# Patient Record
Sex: FEMALE | Race: OTHER | NOT HISPANIC OR LATINO | URBAN - METROPOLITAN AREA
[De-identification: names, ages, dates, MRNs, and addresses within clinical notes are randomized per-mention and may not be internally consistent; named-entity substitution may affect disease eponyms.]

---

## 2017-03-18 ENCOUNTER — EMERGENCY (EMERGENCY)
Facility: HOSPITAL | Age: 27
LOS: 1 days | Discharge: HOME | End: 2017-03-18
Attending: EMERGENCY MEDICINE | Admitting: EMERGENCY MEDICINE
Payer: COMMERCIAL

## 2017-03-18 VITALS
RESPIRATION RATE: 18 BRPM | OXYGEN SATURATION: 100 % | HEART RATE: 80 BPM | TEMPERATURE: 98 F | DIASTOLIC BLOOD PRESSURE: 68 MMHG | SYSTOLIC BLOOD PRESSURE: 113 MMHG

## 2017-03-18 VITALS
OXYGEN SATURATION: 100 % | HEART RATE: 74 BPM | SYSTOLIC BLOOD PRESSURE: 120 MMHG | TEMPERATURE: 99 F | DIASTOLIC BLOOD PRESSURE: 60 MMHG | RESPIRATION RATE: 20 BRPM

## 2017-03-18 LAB
ALBUMIN SERPL ELPH-MCNC: 4.1 G/DL — SIGNIFICANT CHANGE UP (ref 3.3–5)
ALP SERPL-CCNC: 79 U/L — SIGNIFICANT CHANGE UP (ref 40–120)
ALT FLD-CCNC: 11 U/L — SIGNIFICANT CHANGE UP (ref 4–33)
APPEARANCE UR: CLEAR — SIGNIFICANT CHANGE UP
AST SERPL-CCNC: 19 U/L — SIGNIFICANT CHANGE UP (ref 4–32)
BACTERIA # UR AUTO: SIGNIFICANT CHANGE UP
BASOPHILS # BLD AUTO: 0.03 K/UL — SIGNIFICANT CHANGE UP (ref 0–0.2)
BASOPHILS NFR BLD AUTO: 0.3 % — SIGNIFICANT CHANGE UP (ref 0–2)
BILIRUB SERPL-MCNC: 0.3 MG/DL — SIGNIFICANT CHANGE UP (ref 0.2–1.2)
BILIRUB UR-MCNC: NEGATIVE — SIGNIFICANT CHANGE UP
BLOOD UR QL VISUAL: HIGH
BUN SERPL-MCNC: 8 MG/DL — SIGNIFICANT CHANGE UP (ref 7–23)
CALCIUM SERPL-MCNC: 9.3 MG/DL — SIGNIFICANT CHANGE UP (ref 8.4–10.5)
CHLORIDE SERPL-SCNC: 104 MMOL/L — SIGNIFICANT CHANGE UP (ref 98–107)
CO2 SERPL-SCNC: 20 MMOL/L — LOW (ref 22–31)
COLOR SPEC: SIGNIFICANT CHANGE UP
CREAT SERPL-MCNC: 0.56 MG/DL — SIGNIFICANT CHANGE UP (ref 0.5–1.3)
EOSINOPHIL # BLD AUTO: 0.08 K/UL — SIGNIFICANT CHANGE UP (ref 0–0.5)
EOSINOPHIL NFR BLD AUTO: 0.8 % — SIGNIFICANT CHANGE UP (ref 0–6)
GLUCOSE SERPL-MCNC: 85 MG/DL — SIGNIFICANT CHANGE UP (ref 70–99)
GLUCOSE UR-MCNC: NEGATIVE — SIGNIFICANT CHANGE UP
HCG SERPL-ACNC: 2480 MIU/ML — SIGNIFICANT CHANGE UP
HCT VFR BLD CALC: 38 % — SIGNIFICANT CHANGE UP (ref 34.5–45)
HGB BLD-MCNC: 12.1 G/DL — SIGNIFICANT CHANGE UP (ref 11.5–15.5)
IMM GRANULOCYTES NFR BLD AUTO: 0.2 % — SIGNIFICANT CHANGE UP (ref 0–1.5)
KETONES UR-MCNC: NEGATIVE — SIGNIFICANT CHANGE UP
LEUKOCYTE ESTERASE UR-ACNC: NEGATIVE — SIGNIFICANT CHANGE UP
LYMPHOCYTES # BLD AUTO: 2.34 K/UL — SIGNIFICANT CHANGE UP (ref 1–3.3)
LYMPHOCYTES # BLD AUTO: 24.1 % — SIGNIFICANT CHANGE UP (ref 13–44)
MCHC RBC-ENTMCNC: 26.8 PG — LOW (ref 27–34)
MCHC RBC-ENTMCNC: 31.8 % — LOW (ref 32–36)
MCV RBC AUTO: 84.1 FL — SIGNIFICANT CHANGE UP (ref 80–100)
MONOCYTES # BLD AUTO: 0.56 K/UL — SIGNIFICANT CHANGE UP (ref 0–0.9)
MONOCYTES NFR BLD AUTO: 5.8 % — SIGNIFICANT CHANGE UP (ref 2–14)
MUCOUS THREADS # UR AUTO: SIGNIFICANT CHANGE UP
NEUTROPHILS # BLD AUTO: 6.67 K/UL — SIGNIFICANT CHANGE UP (ref 1.8–7.4)
NEUTROPHILS NFR BLD AUTO: 68.8 % — SIGNIFICANT CHANGE UP (ref 43–77)
NITRITE UR-MCNC: NEGATIVE — SIGNIFICANT CHANGE UP
PH UR: 6 — SIGNIFICANT CHANGE UP (ref 4.6–8)
PLATELET # BLD AUTO: 373 K/UL — SIGNIFICANT CHANGE UP (ref 150–400)
PMV BLD: 10 FL — SIGNIFICANT CHANGE UP (ref 7–13)
POTASSIUM SERPL-MCNC: 4.2 MMOL/L — SIGNIFICANT CHANGE UP (ref 3.5–5.3)
POTASSIUM SERPL-SCNC: 4.2 MMOL/L — SIGNIFICANT CHANGE UP (ref 3.5–5.3)
PROT SERPL-MCNC: 7.3 G/DL — SIGNIFICANT CHANGE UP (ref 6–8.3)
PROT UR-MCNC: NEGATIVE — SIGNIFICANT CHANGE UP
RBC # BLD: 4.52 M/UL — SIGNIFICANT CHANGE UP (ref 3.8–5.2)
RBC # FLD: 14 % — SIGNIFICANT CHANGE UP (ref 10.3–14.5)
RBC CASTS # UR COMP ASSIST: SIGNIFICANT CHANGE UP (ref 0–?)
SODIUM SERPL-SCNC: 140 MMOL/L — SIGNIFICANT CHANGE UP (ref 135–145)
SP GR SPEC: 1.01 — SIGNIFICANT CHANGE UP (ref 1–1.03)
SQUAMOUS # UR AUTO: SIGNIFICANT CHANGE UP
UROBILINOGEN FLD QL: NORMAL E.U. — SIGNIFICANT CHANGE UP (ref 0.1–0.2)
WBC # BLD: 9.7 K/UL — SIGNIFICANT CHANGE UP (ref 3.8–10.5)
WBC # FLD AUTO: 9.7 K/UL — SIGNIFICANT CHANGE UP (ref 3.8–10.5)
WBC UR QL: SIGNIFICANT CHANGE UP (ref 0–?)

## 2017-03-18 PROCEDURE — 76830 TRANSVAGINAL US NON-OB: CPT | Mod: 26

## 2017-03-18 PROCEDURE — 99284 EMERGENCY DEPT VISIT MOD MDM: CPT

## 2017-03-18 RX ORDER — SODIUM CHLORIDE 9 MG/ML
1000 INJECTION INTRAMUSCULAR; INTRAVENOUS; SUBCUTANEOUS ONCE
Qty: 0 | Refills: 0 | Status: COMPLETED | OUTPATIENT
Start: 2017-03-18 | End: 2017-03-18

## 2017-03-18 RX ADMIN — SODIUM CHLORIDE 3000 MILLILITER(S): 9 INJECTION INTRAMUSCULAR; INTRAVENOUS; SUBCUTANEOUS at 16:02

## 2017-03-18 NOTE — ED PROVIDER NOTE - NS ED MD SCRIBE ATTENDING SCRIBE SECTIONS
DISPOSITION/VITAL SIGNS( Pullset)/HIV/REVIEW OF SYSTEMS/PAST MEDICAL/SURGICAL/SOCIAL HISTORY/PHYSICAL EXAM/HISTORY OF PRESENT ILLNESS

## 2017-03-18 NOTE — ED PROVIDER NOTE - CARE PLAN
Principal Discharge DX:	Threatened   Secondary Diagnosis:	Vaginal bleeding in pregnancy, first trimester

## 2017-03-18 NOTE — ED PROVIDER NOTE - OBJECTIVE STATEMENT
27yo F A0 LMP 17 6 WGA presenting for vaginal bleeding starting this morning. Pt also c/o pelvic pain, although denies nausea/vomiting or any tissue ot blood clotting. Pt reports she had LEEP procedure last year.

## 2017-03-18 NOTE — ED ADULT TRIAGE NOTE - CHIEF COMPLAINT QUOTE
pt comes to ed for vag bleeding without clots x once this monring  states she has intermittent pain across the pelvic area. pt has two children at home  delivered via c section

## 2017-03-18 NOTE — ED PROVIDER NOTE - PROGRESS NOTE DETAILS
Patient's labs within normal as well as US for IUP. Patient with threatened , she was oriented about proper follow up and that she needs a B-HCG and US in 48 hours, and to return to the ED if profuse bleeding od pass of clots or tissue.

## 2017-03-20 ENCOUNTER — EMERGENCY (EMERGENCY)
Facility: HOSPITAL | Age: 27
LOS: 1 days | Discharge: HOME | End: 2017-03-20
Attending: EMERGENCY MEDICINE | Admitting: EMERGENCY MEDICINE
Payer: COMMERCIAL

## 2017-03-20 VITALS
SYSTOLIC BLOOD PRESSURE: 108 MMHG | HEART RATE: 85 BPM | TEMPERATURE: 98 F | OXYGEN SATURATION: 98 % | RESPIRATION RATE: 18 BRPM | DIASTOLIC BLOOD PRESSURE: 47 MMHG

## 2017-03-20 VITALS — HEART RATE: 67 BPM | OXYGEN SATURATION: 100 % | TEMPERATURE: 98 F | RESPIRATION RATE: 18 BRPM

## 2017-03-20 LAB
BASOPHILS # BLD AUTO: 0.04 K/UL — SIGNIFICANT CHANGE UP (ref 0–0.2)
BASOPHILS NFR BLD AUTO: 0.5 % — SIGNIFICANT CHANGE UP (ref 0–2)
EOSINOPHIL # BLD AUTO: 0.13 K/UL — SIGNIFICANT CHANGE UP (ref 0–0.5)
EOSINOPHIL NFR BLD AUTO: 1.7 % — SIGNIFICANT CHANGE UP (ref 0–6)
HCG SERPL-ACNC: 771 MIU/ML — SIGNIFICANT CHANGE UP
HCT VFR BLD CALC: 37.8 % — SIGNIFICANT CHANGE UP (ref 34.5–45)
HGB BLD-MCNC: 12.1 G/DL — SIGNIFICANT CHANGE UP (ref 11.5–15.5)
IMM GRANULOCYTES NFR BLD AUTO: 0.1 % — SIGNIFICANT CHANGE UP (ref 0–1.5)
LYMPHOCYTES # BLD AUTO: 2.24 K/UL — SIGNIFICANT CHANGE UP (ref 1–3.3)
LYMPHOCYTES # BLD AUTO: 29.2 % — SIGNIFICANT CHANGE UP (ref 13–44)
MCHC RBC-ENTMCNC: 27.1 PG — SIGNIFICANT CHANGE UP (ref 27–34)
MCHC RBC-ENTMCNC: 32 % — SIGNIFICANT CHANGE UP (ref 32–36)
MCV RBC AUTO: 84.8 FL — SIGNIFICANT CHANGE UP (ref 80–100)
MONOCYTES # BLD AUTO: 0.46 K/UL — SIGNIFICANT CHANGE UP (ref 0–0.9)
MONOCYTES NFR BLD AUTO: 6 % — SIGNIFICANT CHANGE UP (ref 2–14)
NEUTROPHILS # BLD AUTO: 4.78 K/UL — SIGNIFICANT CHANGE UP (ref 1.8–7.4)
NEUTROPHILS NFR BLD AUTO: 62.5 % — SIGNIFICANT CHANGE UP (ref 43–77)
PLATELET # BLD AUTO: 386 K/UL — SIGNIFICANT CHANGE UP (ref 150–400)
PMV BLD: 10.3 FL — SIGNIFICANT CHANGE UP (ref 7–13)
RBC # BLD: 4.46 M/UL — SIGNIFICANT CHANGE UP (ref 3.8–5.2)
RBC # FLD: 14.1 % — SIGNIFICANT CHANGE UP (ref 10.3–14.5)
WBC # BLD: 7.66 K/UL — SIGNIFICANT CHANGE UP (ref 3.8–10.5)
WBC # FLD AUTO: 7.66 K/UL — SIGNIFICANT CHANGE UP (ref 3.8–10.5)

## 2017-03-20 PROCEDURE — 99283 EMERGENCY DEPT VISIT LOW MDM: CPT

## 2017-03-20 RX ORDER — SODIUM CHLORIDE 9 MG/ML
1000 INJECTION INTRAMUSCULAR; INTRAVENOUS; SUBCUTANEOUS ONCE
Qty: 0 | Refills: 0 | Status: COMPLETED | OUTPATIENT
Start: 2017-03-20 | End: 2017-03-20

## 2017-03-20 RX ADMIN — SODIUM CHLORIDE 1000 MILLILITER(S): 9 INJECTION INTRAMUSCULAR; INTRAVENOUS; SUBCUTANEOUS at 15:30

## 2017-03-20 NOTE — ED ADULT TRIAGE NOTE - CHIEF COMPLAINT QUOTE
p/t is about 5 weeks pregnant c/o of vag bleed for few days, p/t seen in ED 3 days ago for vag bleed as well nad noted

## 2017-03-20 NOTE — ED PROVIDER NOTE - OBJECTIVE STATEMENT
pt 5-6 weeks pregnant with vaginal bleeding  Here 2 days ago with light bleeding  heavier with cramps today    no fever GI or  complaints  pt had US here  IUP too early to detect FH

## 2017-03-20 NOTE — ED PROVIDER NOTE - MEDICAL DECISION MAKING DETAILS
pregnant with vaginal bleeding  IUP here 2 days ago  check beta  pt RH negative  ( Rh +)  with heavier bleed will give Rhogam pregnant with vaginal bleeding  IUP here 2 days ago  check beta  pt RH negative  ( Rh +)  with heavier bleed will give Rhogam  beta falling os closed no POC passed  will need to follow up with private GYN

## 2019-04-10 NOTE — ED PROVIDER NOTE - RESPIRATORY NEGATIVE STATEMENT, MLM
----- Message from Lacy Lorenzo MD sent at 4/9/2019 10:12 PM CDT -----  Call patient notify   Labs look ok, but creatinine is increasing with water pill. Continue current medication, but recheck bmp in 7-10 days. Rest of labs ok.   no chest pain, no cough, and no shortness of breath.

## 2022-01-03 ENCOUNTER — APPOINTMENT (OUTPATIENT)
Dept: ORTHOPEDIC SURGERY | Facility: CLINIC | Age: 32
End: 2022-01-03

## 2022-03-05 ENCOUNTER — EMERGENCY (EMERGENCY)
Facility: HOSPITAL | Age: 32
LOS: 1 days | Discharge: HOME | End: 2022-03-05
Attending: EMERGENCY MEDICINE
Payer: COMMERCIAL

## 2022-03-05 VITALS
WEIGHT: 134.04 LBS | HEIGHT: 63 IN | OXYGEN SATURATION: 98 % | SYSTOLIC BLOOD PRESSURE: 103 MMHG | RESPIRATION RATE: 16 BRPM | HEART RATE: 55 BPM | TEMPERATURE: 99 F | DIASTOLIC BLOOD PRESSURE: 56 MMHG

## 2022-03-05 VITALS
SYSTOLIC BLOOD PRESSURE: 95 MMHG | HEART RATE: 70 BPM | DIASTOLIC BLOOD PRESSURE: 70 MMHG | OXYGEN SATURATION: 100 % | RESPIRATION RATE: 18 BRPM | TEMPERATURE: 99 F

## 2022-03-05 DIAGNOSIS — Z98.1 ARTHRODESIS STATUS: Chronic | ICD-10-CM

## 2022-03-05 PROCEDURE — 93010 ELECTROCARDIOGRAM REPORT: CPT

## 2022-03-05 PROCEDURE — 93005 ELECTROCARDIOGRAM TRACING: CPT

## 2022-03-05 PROCEDURE — 99283 EMERGENCY DEPT VISIT LOW MDM: CPT

## 2022-03-05 PROCEDURE — 99284 EMERGENCY DEPT VISIT MOD MDM: CPT

## 2022-03-05 PROCEDURE — 82962 GLUCOSE BLOOD TEST: CPT

## 2022-03-05 RX ORDER — ALPRAZOLAM 0.25 MG
2 TABLET ORAL ONCE
Refills: 0 | Status: DISCONTINUED | OUTPATIENT
Start: 2022-03-05 | End: 2022-03-05

## 2022-03-05 RX ORDER — ALPRAZOLAM 0.25 MG
1 TABLET ORAL
Qty: 6 | Refills: 0
Start: 2022-03-05 | End: 2022-03-06

## 2022-03-05 RX ADMIN — Medication 2 MILLIGRAM(S): at 18:53

## 2022-03-05 NOTE — ED PROVIDER NOTE - CLINICAL SUMMARY MEDICAL DECISION MAKING FREE TEXT BOX
ABBEY AGUILERA is a 31 YEAR OLD FEMALE who presents to ER for CC of "Lightheadedness, Dizziness, and Blacking Out" that occurred today when Nurse was speaking w/ ABBEY about the incision site and she got lightheaded, dizzy, and "blacked out..." No falling to the ground, no head strike. Cardiac ROS negative. Infectious ROS negative. Neuro ROS negative. VSS. PE sig only for well healing incision sites w/ dressing in place, otherwise unremarkable. Pt appears anxious, but is well appearing. Obtain EKG, POC Glucose, Orthostatic VS. Cont. to re-assess. Obtain U Preg.

## 2022-03-05 NOTE — ED ADULT NURSE NOTE - OBJECTIVE STATEMENT
Pt is a 31yoF who had a lumbar fusion at [facility], was given percocet for pain to take home. Today pt was seen by a home nurse and had a syncopal episode that lasted 2-3 seconds. Was told by her MD to come to ER to rule out a PE.

## 2022-03-05 NOTE — ED PROVIDER NOTE - ATTENDING CONTRIBUTION TO CARE
I personally saw the patient and performed a substantive portion of the visit including all aspects of the medical decision making.    Patient reporting episode of near syncope this morning associated with significant anxiety/stress after recent spinal surgery.  Also reporting having an adverse reaction to the tizanadine she is taking post op.  Denying chest pains, shortness of breath or palpitations in association.  Unremarkable exam, clean appearing post op site, neuro intact, normal orthostatics.  EKG unremarkable.  No high risk features of history to suggest ACS or PE as underlying etiology, suspect syncope secondary to emotional stress, patient reporting feeling improved after PO xanax in Emergency Department will discharge with outpatient follow up.

## 2022-03-05 NOTE — ED PROVIDER NOTE - INTERPRETATION
normal sinus rhythm, Normal axis, Normal TN interval and QRS complex. There are no acute ischemic ST or T-wave changes. sinus bradycardia

## 2022-03-05 NOTE — ED PROVIDER NOTE - NEUROLOGICAL, MLM
Alert and oriented, no focal deficits, no motor or sensory deficits. Neuro: Strength 5/5 in Bilateral Upper and Lower Extremities. Sensation intact in bilateral upper and lower extremities. PERRLA. EOMs full and intact. Sensation intact in the face. Patient able to smile, puff out cheeks, close eyes tightly and prevent eye-opening by examiner. Patient able to shoulder shrug against resistance. No deviation of the tongue. Palate and uvula rise - midline uvula.

## 2022-03-05 NOTE — ED ADULT NURSE NOTE - CADM POA PRESS ULCER
Urine Dip 11/18/2021   Blood neg   Leukocytes trace       No bleeding. No leaking of fluids. No contractions.   No

## 2022-03-05 NOTE — ED ADULT NURSE REASSESSMENT NOTE - NS ED NURSE REASSESS COMMENT FT1
orthostatic vitals normal, pt adjusted back into the stretcher. Pt continues to appear very anxious but pain free at this time,  at bedside.

## 2022-03-05 NOTE — ED ADULT NURSE REASSESSMENT NOTE - NS ED NURSE REASSESS COMMENT FT1
pt continues to be alert, v/s stable and will give tray of food.  at bedside, pt appears less anxious.

## 2022-03-05 NOTE — ED PROVIDER NOTE - OBJECTIVE STATEMENT
ABBEY AGUILERA is a 31 YEAR OLD FEMALE ABBEY AGUILERA is a 31 YEAR OLD FEMALE who presents to ER for CC of "Lightheadedness, Dizziness, and Blacking Out."  Last Oxycodone was in AM; Last Tizinadine at 130PM  ABBEY reports that today she had a BM in the morning, then went about her normal day  The nurse came to the house at 1500PM where they had ABBEY walk  Afterwards, ABBEY and the nurse were discussing the incision site when ABBEY started to get dizzy and appeared to "black out..." She did not fall to the ground, did not strike her head  ABBEY and  also report that the medications she was prescribed are occasionally making her feel "delirious" and have a sense of unreality  Denies fevers, chills, warmth of the incision site, purulent drainage from the incision site, tenderness of the incision site  Denies headache, vomiting, nausea, weakness of the extremities, sensory changes, convulsions  Denies palpitations, chest pain, shortness of breath, difficulty breathing, edema, calf swelling, calf pain  PMH: L5-S1 Spinal Injury (Slipped Disk w/ Neuropathic Pain)  Meds: Oxycodone 10mg q6h prn pain; Tizinadine 4mg tid  PSH: 2/28/2022 w/ Lumbarectomy L5-S1 Fusion performed by Dr. Rich Yarbrough at [facility] ABBEY AGUILERA is a 31 YEAR OLD FEMALE who presents to ER for CC of "Lightheadedness, Dizziness, and Blacking Out."  Last Oxycodone was in AM; Last Tizinadine at 130PM  ABBEY reports that today she had a BM in the morning, then went about her normal day  The nurse came to the house at 1500PM where they had ABBEY walk  Afterwards, ABBEY and the nurse were discussing the incision site when ABBEY started to get dizzy and appeared to "black out..." She did not fall to the ground, did not strike her head  ABBEY and  also report that the medications she was prescribed are occasionally making her feel "delirious" and have a sense of unreality  Denies fevers, chills, warmth of the incision site, purulent drainage from the incision site, tenderness of the incision site  Denies headache, vomiting, nausea, weakness of the extremities, sensory changes, convulsions  Denies palpitations, chest pain, shortness of breath, difficulty breathing, edema, calf swelling, calf pain  Currently on her menstrual period  PMH: L5-S1 Spinal Injury (Slipped Disk w/ Neuropathic Pain)  Meds: Oxycodone 10mg q6h prn pain; Tizinadine 4mg tid  PSH: 2/28/2022 w/ Lumbarectomy L5-S1 Fusion performed by Dr. Rich Yarbrough at [facility]  IUTD ABBEY AGUILERA is a 31 YEAR OLD FEMALE who presents to ER for CC of "Lightheadedness, Dizziness, and Blacking Out."  Last Oxycodone was in AM; Last Tizinadine at 130PM  ABBEY reports that today she had a BM in the morning, then went about her normal day  The nurse came to the house at 1500PM where they had ABBEY walk  Afterwards, ABBEY and the nurse were discussing the incision site when ABBEY started to get dizzy and appeared to "black out..." She did not fall to the ground, did not strike her head  ABBEY and  also report that the medications she was prescribed are occasionally making her feel "delirious" and have a sense of unreality  Denies fevers, chills, warmth of the incision site, purulent drainage from the incision site, tenderness of the incision site  Denies headache, vomiting, nausea, weakness of the extremities, sensory changes, convulsions  Denies palpitations, chest pain, shortness of breath, difficulty breathing, edema, calf swelling, calf pain  Currently on her menstrual period  PMH: L5-S1 Spinal Injury (Slipped Disk w/ Neuropathic Pain), Anxiety  Meds: Oxycodone 10mg q6h prn pain; Tizinadine 4mg tid; Xanax 2mg prn Anxiety  PSH: 2/28/2022 w/ Lumbarectomy L5-S1 Fusion performed by Dr. Rich Yarbrough at [facility]  NKDA  IUTD

## 2022-03-05 NOTE — ED PROVIDER NOTE - PROGRESS NOTE DETAILS
POC Glucose Normal. EKG w/ sinus bradycardia (57bpm). Orthostatic VS WNL. Will re-assess pt. Kannan Nazario PA-C Pt continues to have anxiety. Has home med of 2mg Xanax prn anxiety. Will give home medication here for anxiety. Cont. to re-assess. Kannan Nazario PA-C Pt feels much better. Feels ready to go home. Not experiencing any pain. Resting comfortably. Exam unremarkable. Patient is stable, in no apparent distress, non-toxic appearing, tolerating PO, no neurologic deficits, and is cleared for discharge to home. Kannan Nazario PA-C Pt feels much better. Feels ready to go home. Not experiencing any pain. Resting comfortably. Exam unremarkable. Patient is stable, in no apparent distress, non-toxic appearing, tolerating PO, no neurologic deficits, and is cleared for discharge to home. Pt continuing to have anxiety over past few days s/p surgical procedure. Dr. Shi and I agree that we will write for 2 days of Xanax 2mg tid prn anxiety but not to be used within 4 hours of using Oxycodone. F/U PMD on Monday. Kannan Nazario PA-C Pt continues to have anxiety. Was receiving 2mg Xanax prn anxiety s/p surgery while admitted. Will give dose of Xanax here for anxiety. Cont. to re-assess. Kannan Nazario PA-C

## 2022-03-05 NOTE — ED PROVIDER NOTE - PHYSICAL EXAMINATION
Skin: Back w/ surgical incision sites w/ dressing in place. Healing normally. No warmth, tenderness, purulent drainage.

## 2022-03-05 NOTE — ED PROVIDER NOTE - NSFOLLOWUPINSTRUCTIONS_ED_ALL_ED_FT
ABBEY was seen in the ER for Lightheadedness, Dizziness, and "Blacking Out." Based on the history, it appears that she had an episode of Syncope, or fainting.    Her blood sugar was normal. Her EKG was normal. Her orthostatic vital signs were normal.    She received a dose of her home medication, Xanax 2mg, while here in the ER.    Her vital signs remained normal while in the ER - her physical examination was normal. There were no signs of infection at the surgical site.    Continue Oxycodone as prescribed by your Neurosurgical team. It appears that ABBEY may be having a reaction to her medication, and we feel that discontinuing use of the Tinazidine may be beneficial to prevent medication side effects.    You may also use Tylenol 975mg every 6 Hours as needed for pain.    Follow up with your Neurosurgical Doctor for your regularly scheduled appointment.    Return to the ER for any new emergency concerns - refer to the instructions below for reasons to return to the ER.    Return for infectious signs or symptoms like fever, chills, pus from the surgical site, extreme tenderness of the surgical site, or any other concerns.            Syncope    WHAT YOU NEED TO KNOW:    Syncope is also called fainting or passing out. Syncope is a sudden, temporary loss of consciousness, followed by a fall from a standing or sitting position. Syncope ranges from not serious to a sign of a more serious condition that needs to be treated. You can control some health conditions that cause syncope. Your healthcare providers can help you create a plan to manage syncope and prevent episodes.    DISCHARGE INSTRUCTIONS:    Seek care immediately if:   •You are bleeding because you hit your head when you fainted.       •You suddenly have double vision, difficulty speaking, numbness, and cannot move your arms or legs.      •You have chest pain and trouble breathing.      •You vomit blood or material that looks like coffee grounds.      •You see blood in your bowel movement.      Contact your healthcare provider if:   •You have new or worsening symptoms.      •You have another syncope episode.      •You have a headache, fast heartbeat, or feel too dizzy to stand up.      •You have questions or concerns about your condition or care.      Medicines:   •Medicines may be needed to help your heart pump strongly and regularly. Your healthcare provider may also make changes to any medicines that are causing syncope.       •Take your medicine as directed. Contact your healthcare provider if you think your medicine is not helping or if you have side effects. Tell him or her if you are allergic to any medicine. Keep a list of the medicines, vitamins, and herbs you take. Include the amounts, and when and why you take them. Bring the list or the pill bottles to follow-up visits. Carry your medicine list with you in case of an emergency.      Follow up with your doctor as directed: Write down your questions so you remember to ask them during your visits.     Manage syncope:   •Keep a record of your syncope episodes. Include your symptoms and your activity before and after the episode. The record can help your healthcare provider find the cause of your syncope and help you manage episodes.      •Sit or lie down when needed. This includes when you feel dizzy, your throat is getting tight, and your vision changes. Raise your legs above the level of your heart.      •Take slow, deep breaths if you start to breathe faster with anxiety or fear. This can help decrease dizziness and the feeling that you might faint.       •Check your blood pressure often. This is important if you take medicine to lower your blood pressure. Check your blood pressure when you are lying down and when you are standing. Ask how often to check during the day. Keep a record of your blood pressure numbers. Your healthcare provider may use the record to help plan your treatment.  How to take a Blood Pressure           Prevent a syncope episode:   •Move slowly and let yourself get used to one position before you move to another position. This is very important when you change from a lying or sitting position to a standing position. Take some deep breaths before you stand up from a lying position. Stand up slowly. Sudden movements may cause a fainting spell. Sit on the side of the bed or couch for a few minutes before you stand up. If you are on bedrest, try to be upright for about 2 hours each day, or as directed. Do not lock your legs if you are standing for a long period of time. Move your legs and bend your knees to keep blood flowing.      •Follow your healthcare provider's recommendations. Your provider may recommend that you drink more liquids to prevent dehydration. You may also need to have more salt to keep your blood pressure from dropping too low and causing syncope. Your provider will tell you how much liquid and sodium to have each day. He or she will also tell you how much physical activity is safe for you. This will depend on what is causing your syncope.      •Watch for signs of low blood sugar. These include hunger, nervousness, sweating, and fast or fluttery heartbeats. Talk with your healthcare provider about ways to keep your blood sugar level steady.      •Do not strain if you are constipated. You may faint if you strain to have a bowel movement. Walking is the best way to get your bowels moving. Eat foods high in fiber to make it easier to have a bowel movement. Good examples are high-fiber cereals, beans, vegetables, and whole-grain breads. Prune juice may help make bowel movements softer.      •Be careful in hot weather. Heat can cause a syncope episode. Limit activity done outside on hot days. Physical activity in hot weather can lead to dehydration. This can cause an episode. ABBEY was seen in the ER for Lightheadedness, Dizziness, and "Blacking Out." Based on the history, it appears that she had an episode of Syncope, or fainting.    Her blood sugar was normal. Her EKG was normal. Her orthostatic vital signs were normal.    She received a dose of her home medication, Xanax 2mg, while here in the ER.    Her vital signs remained normal while in the ER - her physical examination was normal. There were no signs of infection at the surgical site.    Continue Oxycodone as prescribed by your Neurosurgical team. It appears that ABBEY may be having a reaction to her medication, and we feel that discontinuing use of the Tinazidine may be beneficial to prevent medication side effects.    You may also use Tylenol 975mg every 6 Hours as needed for pain.    Given the severe amount of anxiety that ABBEY is facing, we will prescribe a 2 day course of Xanax 2mg Orally to be used every 8 hours ONLY AS NEEDED FOR ANXIETY. This medication SHOULD NOT be taken at the same time as the Oxycodone, and MUST BE  BY AT LEAST 4 HOURS FROM GIVING Oxycodone.    Follow up with your Neurosurgical Doctor for your regularly scheduled appointment.    Return to the ER for any new emergency concerns - refer to the instructions below for reasons to return to the ER.    Return for infectious signs or symptoms like fever, chills, pus from the surgical site, extreme tenderness of the surgical site, or any other concerns.            Syncope    WHAT YOU NEED TO KNOW:    Syncope is also called fainting or passing out. Syncope is a sudden, temporary loss of consciousness, followed by a fall from a standing or sitting position. Syncope ranges from not serious to a sign of a more serious condition that needs to be treated. You can control some health conditions that cause syncope. Your healthcare providers can help you create a plan to manage syncope and prevent episodes.    DISCHARGE INSTRUCTIONS:    Seek care immediately if:   •You are bleeding because you hit your head when you fainted.       •You suddenly have double vision, difficulty speaking, numbness, and cannot move your arms or legs.      •You have chest pain and trouble breathing.      •You vomit blood or material that looks like coffee grounds.      •You see blood in your bowel movement.      Contact your healthcare provider if:   •You have new or worsening symptoms.      •You have another syncope episode.      •You have a headache, fast heartbeat, or feel too dizzy to stand up.      •You have questions or concerns about your condition or care.      Medicines:   •Medicines may be needed to help your heart pump strongly and regularly. Your healthcare provider may also make changes to any medicines that are causing syncope.       •Take your medicine as directed. Contact your healthcare provider if you think your medicine is not helping or if you have side effects. Tell him or her if you are allergic to any medicine. Keep a list of the medicines, vitamins, and herbs you take. Include the amounts, and when and why you take them. Bring the list or the pill bottles to follow-up visits. Carry your medicine list with you in case of an emergency.      Follow up with your doctor as directed: Write down your questions so you remember to ask them during your visits.     Manage syncope:   •Keep a record of your syncope episodes. Include your symptoms and your activity before and after the episode. The record can help your healthcare provider find the cause of your syncope and help you manage episodes.      •Sit or lie down when needed. This includes when you feel dizzy, your throat is getting tight, and your vision changes. Raise your legs above the level of your heart.      •Take slow, deep breaths if you start to breathe faster with anxiety or fear. This can help decrease dizziness and the feeling that you might faint.       •Check your blood pressure often. This is important if you take medicine to lower your blood pressure. Check your blood pressure when you are lying down and when you are standing. Ask how often to check during the day. Keep a record of your blood pressure numbers. Your healthcare provider may use the record to help plan your treatment.  How to take a Blood Pressure           Prevent a syncope episode:   •Move slowly and let yourself get used to one position before you move to another position. This is very important when you change from a lying or sitting position to a standing position. Take some deep breaths before you stand up from a lying position. Stand up slowly. Sudden movements may cause a fainting spell. Sit on the side of the bed or couch for a few minutes before you stand up. If you are on bedrest, try to be upright for about 2 hours each day, or as directed. Do not lock your legs if you are standing for a long period of time. Move your legs and bend your knees to keep blood flowing.      •Follow your healthcare provider's recommendations. Your provider may recommend that you drink more liquids to prevent dehydration. You may also need to have more salt to keep your blood pressure from dropping too low and causing syncope. Your provider will tell you how much liquid and sodium to have each day. He or she will also tell you how much physical activity is safe for you. This will depend on what is causing your syncope.      •Watch for signs of low blood sugar. These include hunger, nervousness, sweating, and fast or fluttery heartbeats. Talk with your healthcare provider about ways to keep your blood sugar level steady.      •Do not strain if you are constipated. You may faint if you strain to have a bowel movement. Walking is the best way to get your bowels moving. Eat foods high in fiber to make it easier to have a bowel movement. Good examples are high-fiber cereals, beans, vegetables, and whole-grain breads. Prune juice may help make bowel movements softer.      •Be careful in hot weather. Heat can cause a syncope episode. Limit activity done outside on hot days. Physical activity in hot weather can lead to dehydration. This can cause an episode. ABBEY was seen in the ER for Lightheadedness, Dizziness, and "Blacking Out." Based on the history, it appears that she had an episode of Syncope, or fainting.    Her blood sugar was normal. Her EKG was normal. Her orthostatic vital signs were normal.    She received a dose of a medications she had received during her post operative stay called Xanax 2mg, which helps with anxiety, while here in the ER.    Her vital signs remained normal while in the ER - her physical examination was normal. There were no signs of infection at the surgical site.    Continue Oxycodone as prescribed by your Neurosurgical team. It appears that ABBEY may be having a reaction to her medication, and we feel that discontinuing use of the Tinazidine may be beneficial to prevent medication side effects.    You may also use Tylenol 975mg every 6 Hours as needed for pain.    Given the severe amount of anxiety that ABBEY is facing, we will prescribe a 2 day course of Xanax 2mg Orally to be used every 8 hours ONLY AS NEEDED FOR ANXIETY. This medication SHOULD NOT be taken at the same time as the Oxycodone, and MUST BE  BY AT LEAST 4 HOURS FROM GIVING Oxycodone.     Follow up with PMD on Monday.    Follow up with your Neurosurgical Doctor for your regularly scheduled appointment.    Return to the ER for any new emergency concerns - refer to the instructions below for reasons to return to the ER.    Return for infectious signs or symptoms like fever, chills, pus from the surgical site, extreme tenderness of the surgical site, or any other concerns.            Syncope    WHAT YOU NEED TO KNOW:    Syncope is also called fainting or passing out. Syncope is a sudden, temporary loss of consciousness, followed by a fall from a standing or sitting position. Syncope ranges from not serious to a sign of a more serious condition that needs to be treated. You can control some health conditions that cause syncope. Your healthcare providers can help you create a plan to manage syncope and prevent episodes.    DISCHARGE INSTRUCTIONS:    Seek care immediately if:   •You are bleeding because you hit your head when you fainted.       •You suddenly have double vision, difficulty speaking, numbness, and cannot move your arms or legs.      •You have chest pain and trouble breathing.      •You vomit blood or material that looks like coffee grounds.      •You see blood in your bowel movement.      Contact your healthcare provider if:   •You have new or worsening symptoms.      •You have another syncope episode.      •You have a headache, fast heartbeat, or feel too dizzy to stand up.      •You have questions or concerns about your condition or care.      Medicines:   •Medicines may be needed to help your heart pump strongly and regularly. Your healthcare provider may also make changes to any medicines that are causing syncope.       •Take your medicine as directed. Contact your healthcare provider if you think your medicine is not helping or if you have side effects. Tell him or her if you are allergic to any medicine. Keep a list of the medicines, vitamins, and herbs you take. Include the amounts, and when and why you take them. Bring the list or the pill bottles to follow-up visits. Carry your medicine list with you in case of an emergency.      Follow up with your doctor as directed: Write down your questions so you remember to ask them during your visits.     Manage syncope:   •Keep a record of your syncope episodes. Include your symptoms and your activity before and after the episode. The record can help your healthcare provider find the cause of your syncope and help you manage episodes.      •Sit or lie down when needed. This includes when you feel dizzy, your throat is getting tight, and your vision changes. Raise your legs above the level of your heart.      •Take slow, deep breaths if you start to breathe faster with anxiety or fear. This can help decrease dizziness and the feeling that you might faint.       •Check your blood pressure often. This is important if you take medicine to lower your blood pressure. Check your blood pressure when you are lying down and when you are standing. Ask how often to check during the day. Keep a record of your blood pressure numbers. Your healthcare provider may use the record to help plan your treatment.  How to take a Blood Pressure           Prevent a syncope episode:   •Move slowly and let yourself get used to one position before you move to another position. This is very important when you change from a lying or sitting position to a standing position. Take some deep breaths before you stand up from a lying position. Stand up slowly. Sudden movements may cause a fainting spell. Sit on the side of the bed or couch for a few minutes before you stand up. If you are on bedrest, try to be upright for about 2 hours each day, or as directed. Do not lock your legs if you are standing for a long period of time. Move your legs and bend your knees to keep blood flowing.      •Follow your healthcare provider's recommendations. Your provider may recommend that you drink more liquids to prevent dehydration. You may also need to have more salt to keep your blood pressure from dropping too low and causing syncope. Your provider will tell you how much liquid and sodium to have each day. He or she will also tell you how much physical activity is safe for you. This will depend on what is causing your syncope.      •Watch for signs of low blood sugar. These include hunger, nervousness, sweating, and fast or fluttery heartbeats. Talk with your healthcare provider about ways to keep your blood sugar level steady.      •Do not strain if you are constipated. You may faint if you strain to have a bowel movement. Walking is the best way to get your bowels moving. Eat foods high in fiber to make it easier to have a bowel movement. Good examples are high-fiber cereals, beans, vegetables, and whole-grain breads. Prune juice may help make bowel movements softer.      •Be careful in hot weather. Heat can cause a syncope episode. Limit activity done outside on hot days. Physical activity in hot weather can lead to dehydration. This can cause an episode.

## 2022-03-05 NOTE — ED PROVIDER NOTE - PATIENT PORTAL LINK FT
You can access the FollowMyHealth Patient Portal offered by [facility] by registering at the following website: [email or web address]. By joining [employer]’s FollowMyHealth portal, you will also be able to view your health information using other applications (apps) compatible with our system.

## 2022-03-05 NOTE — ED PROVIDER NOTE - CONSTITUTIONAL, MLM
normal... Anxious, but Well appearing, awake, alert, oriented to person, place, time/situation and in no apparent distress.

## 2022-03-05 NOTE — ED ADULT NURSE NOTE - NSIMPLEMENTINTERV_GEN_ALL_ED
Implemented All Fall with Harm Risk Interventions:  [place] to call system. Call bell, personal items and telephone within reach. Instruct patient to call for assistance. Room bathroom lighting operational. Non-slip footwear when patient is off stretcher. Physically safe environment: no spills, clutter or unnecessary equipment. Stretcher in lowest position, wheels locked, appropriate side rails in place. Provide visual cue, wrist band, yellow gown, etc. Monitor gait and stability. Monitor for mental status changes and reorient to person, place, and time. Review medications for side effects contributing to fall risk. Reinforce activity limits and safety measures with patient and family. Provide visual clues: red socks.

## 2022-03-05 NOTE — ED ADULT NURSE NOTE - NS PRO PASSIVE SMOKE EXP
A-V fistula  left arm 5/2017  H/O angioplasty  2013,  no  intervention  H/O carotid endarterectomy  Right  H/O circumcision  at  age  64  H/O left knee surgery No

## 2022-03-06 RX ORDER — LIDOCAINE 4 G/100G
0 CREAM TOPICAL
Qty: 0 | Refills: 0 | DISCHARGE

## 2022-03-06 RX ORDER — TIZANIDINE 4 MG/1
2 TABLET ORAL
Qty: 0 | Refills: 0 | DISCHARGE

## 2022-03-08 ENCOUNTER — EMERGENCY (EMERGENCY)
Facility: HOSPITAL | Age: 32
LOS: 1 days | Discharge: HOME | End: 2022-03-08
Attending: EMERGENCY MEDICINE
Payer: COMMERCIAL

## 2022-03-08 VITALS
OXYGEN SATURATION: 99 % | HEIGHT: 63 IN | TEMPERATURE: 98 F | DIASTOLIC BLOOD PRESSURE: 80 MMHG | HEART RATE: 88 BPM | WEIGHT: 233.91 LBS | RESPIRATION RATE: 18 BRPM | SYSTOLIC BLOOD PRESSURE: 94 MMHG

## 2022-03-08 DIAGNOSIS — Z98.1 ARTHRODESIS STATUS: Chronic | ICD-10-CM

## 2022-03-08 LAB
ALBUMIN SERPL ELPH-MCNC: 4.2 G/DL — SIGNIFICANT CHANGE UP (ref 3.3–5)
ALP SERPL-CCNC: 88 U/L — SIGNIFICANT CHANGE UP (ref 40–120)
ALT FLD-CCNC: 38 U/L — SIGNIFICANT CHANGE UP (ref 10–45)
ANION GAP SERPL CALC-SCNC: 15 MMOL/L — SIGNIFICANT CHANGE UP (ref 5–17)
APPEARANCE UR: CLEAR — SIGNIFICANT CHANGE UP
AST SERPL-CCNC: 32 U/L — SIGNIFICANT CHANGE UP (ref 10–40)
BACTERIA # UR AUTO: NEGATIVE — SIGNIFICANT CHANGE UP
BILIRUB SERPL-MCNC: 0.2 MG/DL — SIGNIFICANT CHANGE UP (ref 0.2–1.2)
BILIRUB UR-MCNC: NEGATIVE — SIGNIFICANT CHANGE UP
BUN SERPL-MCNC: 10 MG/DL — SIGNIFICANT CHANGE UP (ref 7–23)
CALCIUM SERPL-MCNC: 9.8 MG/DL — SIGNIFICANT CHANGE UP (ref 8.4–10.5)
CHLORIDE SERPL-SCNC: 100 MMOL/L — SIGNIFICANT CHANGE UP (ref 96–108)
CO2 SERPL-SCNC: 21 MMOL/L — LOW (ref 22–31)
COLOR SPEC: SIGNIFICANT CHANGE UP
CREAT SERPL-MCNC: 0.66 MG/DL — SIGNIFICANT CHANGE UP (ref 0.5–1.3)
DIFF PNL FLD: ABNORMAL
EGFR: 119 ML/MIN/1.73M2 — SIGNIFICANT CHANGE UP
EPI CELLS # UR: 2 /HPF — SIGNIFICANT CHANGE UP
GLUCOSE SERPL-MCNC: 102 MG/DL — HIGH (ref 70–99)
GLUCOSE UR QL: NEGATIVE — SIGNIFICANT CHANGE UP
KETONES UR-MCNC: NEGATIVE — SIGNIFICANT CHANGE UP
LEUKOCYTE ESTERASE UR-ACNC: NEGATIVE — SIGNIFICANT CHANGE UP
NITRITE UR-MCNC: NEGATIVE — SIGNIFICANT CHANGE UP
PH UR: 6.5 — SIGNIFICANT CHANGE UP (ref 5–8)
POTASSIUM SERPL-MCNC: 4.5 MMOL/L — SIGNIFICANT CHANGE UP (ref 3.5–5.3)
POTASSIUM SERPL-SCNC: 4.5 MMOL/L — SIGNIFICANT CHANGE UP (ref 3.5–5.3)
PROT SERPL-MCNC: 7.8 G/DL — SIGNIFICANT CHANGE UP (ref 6–8.3)
PROT UR-MCNC: NEGATIVE — SIGNIFICANT CHANGE UP
RBC CASTS # UR COMP ASSIST: 3 /HPF — SIGNIFICANT CHANGE UP (ref 0–4)
SODIUM SERPL-SCNC: 136 MMOL/L — SIGNIFICANT CHANGE UP (ref 135–145)
SP GR SPEC: 1.02 — SIGNIFICANT CHANGE UP (ref 1.01–1.02)
UROBILINOGEN FLD QL: NEGATIVE — SIGNIFICANT CHANGE UP
WBC UR QL: 2 /HPF — SIGNIFICANT CHANGE UP (ref 0–5)

## 2022-03-08 PROCEDURE — 72158 MRI LUMBAR SPINE W/O & W/DYE: CPT | Mod: 26,MA

## 2022-03-08 PROCEDURE — 99284 EMERGENCY DEPT VISIT MOD MDM: CPT

## 2022-03-08 RX ORDER — ALPRAZOLAM 0.25 MG
2 TABLET ORAL ONCE
Refills: 0 | Status: COMPLETED | OUTPATIENT
Start: 2022-03-08 | End: 2022-03-15

## 2022-03-08 RX ORDER — ALPRAZOLAM 0.25 MG
2 TABLET ORAL ONCE
Refills: 0 | Status: DISCONTINUED | OUTPATIENT
Start: 2022-03-08 | End: 2022-03-08

## 2022-03-08 RX ADMIN — Medication 2 MILLIGRAM(S): at 22:06

## 2022-03-08 NOTE — ED ADULT TRIAGE NOTE - CHIEF COMPLAINT QUOTE
Pt c/o "rt pelvic/groin numbness x few days, Lumbar Fusion 2/28/22. Sent in by my Neurologist for MRI"

## 2022-03-08 NOTE — ED PROVIDER NOTE - PATIENT PORTAL LINK FT
You can access the FollowMyHealth Patient Portal offered by [facility] by registering at the following website: [email or web address]. By joining [employer]’s FollowMyHealth portal, you will also be able to view your health information using other applications (apps) compatible with our system.

## 2022-03-08 NOTE — ED ADULT NURSE NOTE - NSIMPLEMENTINTERV_GEN_ALL_ED
Implemented All Universal Safety Interventions:  [place] to call system. Call bell, personal items and telephone within reach. Instruct patient to call for assistance. Room bathroom lighting operational. Non-slip footwear when patient is off stretcher. Physically safe environment: no spills, clutter or unnecessary equipment. Stretcher in lowest position, wheels locked, appropriate side rails in place.

## 2022-03-08 NOTE — ED PROVIDER NOTE - PROGRESS NOTE DETAILS
Damian PGY3 - MRI shows post-surgical changes w/o any acute pathology.  Pt. ambulated around the ED multiple times.  Feels well.  Will f/u w/ spine surgeon.  Pt. aware and agrees w/ plan.  All other questions and concerns have been addressed.

## 2022-03-08 NOTE — ED PROVIDER NOTE - OBJECTIVE STATEMENT
32 y/o female with PMHx of (2/28/2022) Lumbarectomy L5-S1 Fusion performed by Dr. Rich Yarbrough at [facility], presents to the ED complaining of right buttocks/groin numbness since surgery. Patient states that she has been taking a lot of pain killers/muscle relaxers and initially did not notice the numbness. However, past few days she is weening off of the medication she has begun to feel numbness to the right side of her vagina/buttocks region. She states that she called her doctor today and he instructed her to go to the emergency department for a MRI of her spine. Patient states that she has been attempting to walk with walk past few days. Denies any recent illness, headache, fever, chills, chest pain, cough, vomiting, diarrhea. Was seen at [facility] ED few days ago for syncopal episode which she attributes to taking too much pain medication.

## 2022-03-08 NOTE — ED PROVIDER NOTE - NSFOLLOWUPINSTRUCTIONS_ED_ALL_ED_FT
You were seen for numbness and tingling of your buttocks.    Your work-up in the Emergency Department did not reveal anything acutely concerning.    Follow up with your spine surgeon in the next 2-3 days and bring a copy of your results.    If you have any severe increase in pain, fever, uncontrollable nausea vomiting, inability to tolerate eating and drinking, or any other concerning symptoms, return immediately to the Emergency Department.

## 2022-03-08 NOTE — ED PROVIDER NOTE - PHYSICAL EXAMINATION
CONSTITUTIONAL: Patient is awake, alert and oriented x 3. Patient is well appearing and in no acute distress.  HEAD: NCAT  NECK: Supple,   LUNGS: CTA B/L,  HEART: RRR.+S1S2  MSK: FROM upper and lower ext b/l,   SKIN: No rash or lesions  NEURO: No focal deficits, (+) numbness to right buttocks region, right lateral anterior thigh, right inner thigh;

## 2022-03-08 NOTE — ED ADULT NURSE NOTE - OBJECTIVE STATEMENT
30 y/o female with PMHx of (2/28/2022) Lumbarectomy L5-S1 Fusion performed by Dr. Rich Yarbrough at [facility], presents to the ED complaining of right buttocks/groin numbness since surgery. Patient states that she has been taking a lot of pain killers/muscle relaxers and initially did not notice the numbness. However, past few days she is weening off of the medication she has begun to feel numbness to the right side of her vagina/buttocks region. She states that she called her doctor today and he instructed her to go to the emergency department for a MRI of her spine. Patient states that she has been attempting to walk with walk past few days. Denies any recent illness, headache, fever, chills, chest pain, cough, vomiting, diarrhea. Was seen at [facility] ED few days ago for syncopal episode which she attributes to taking too much pain medication. Pt walks with walker independently.

## 2022-03-08 NOTE — ED PROVIDER NOTE - ATTENDING CONTRIBUTION TO CARE
30 yo female s/p 1 level lumbar lami/fusion at OSH p/w R buttock/thigh paresthesias, sent in by her neurosurgeon (not affiliated) for MR eval for post-op collection/hematoma.  ambulatory here.  will check labs, MRI lumbar spine and reassess.

## 2022-03-09 ENCOUNTER — TRANSCRIPTION ENCOUNTER (OUTPATIENT)
Age: 32
End: 2022-03-09

## 2022-03-09 VITALS
RESPIRATION RATE: 16 BRPM | DIASTOLIC BLOOD PRESSURE: 59 MMHG | SYSTOLIC BLOOD PRESSURE: 109 MMHG | TEMPERATURE: 98 F | OXYGEN SATURATION: 100 % | HEART RATE: 92 BPM

## 2022-03-09 PROBLEM — M54.9 DORSALGIA, UNSPECIFIED: Chronic | Status: ACTIVE | Noted: 2022-03-05

## 2022-03-09 LAB
BASOPHILS # BLD AUTO: 0.07 K/UL — SIGNIFICANT CHANGE UP (ref 0–0.2)
BASOPHILS NFR BLD AUTO: 0.6 % — SIGNIFICANT CHANGE UP (ref 0–2)
EOSINOPHIL # BLD AUTO: 0.31 K/UL — SIGNIFICANT CHANGE UP (ref 0–0.5)
EOSINOPHIL NFR BLD AUTO: 2.5 % — SIGNIFICANT CHANGE UP (ref 0–6)
HCT VFR BLD CALC: 40 % — SIGNIFICANT CHANGE UP (ref 34.5–45)
HGB BLD-MCNC: 12.5 G/DL — SIGNIFICANT CHANGE UP (ref 11.5–15.5)
IMM GRANULOCYTES NFR BLD AUTO: 0.6 % — SIGNIFICANT CHANGE UP (ref 0–1.5)
LYMPHOCYTES # BLD AUTO: 24.6 % — SIGNIFICANT CHANGE UP (ref 13–44)
LYMPHOCYTES # BLD AUTO: 3.06 K/UL — SIGNIFICANT CHANGE UP (ref 1–3.3)
MCHC RBC-ENTMCNC: 26.4 PG — LOW (ref 27–34)
MCHC RBC-ENTMCNC: 31.3 GM/DL — LOW (ref 32–36)
MCV RBC AUTO: 84.6 FL — SIGNIFICANT CHANGE UP (ref 80–100)
MONOCYTES # BLD AUTO: 0.8 K/UL — SIGNIFICANT CHANGE UP (ref 0–0.9)
MONOCYTES NFR BLD AUTO: 6.4 % — SIGNIFICANT CHANGE UP (ref 2–14)
NEUTROPHILS # BLD AUTO: 8.15 K/UL — HIGH (ref 1.8–7.4)
NEUTROPHILS NFR BLD AUTO: 65.3 % — SIGNIFICANT CHANGE UP (ref 43–77)
NRBC # BLD: 0 /100 WBCS — SIGNIFICANT CHANGE UP (ref 0–0)
PLATELET # BLD AUTO: 770 K/UL — HIGH (ref 150–400)
RBC # BLD: 4.73 M/UL — SIGNIFICANT CHANGE UP (ref 3.8–5.2)
RBC # FLD: 13.8 % — SIGNIFICANT CHANGE UP (ref 10.3–14.5)
SARS-COV-2 RNA SPEC QL NAA+PROBE: SIGNIFICANT CHANGE UP
WBC # BLD: 12.46 K/UL — HIGH (ref 3.8–10.5)
WBC # FLD AUTO: 12.46 K/UL — HIGH (ref 3.8–10.5)

## 2022-03-09 PROCEDURE — U0005: CPT

## 2022-03-09 PROCEDURE — 72158 MRI LUMBAR SPINE W/O & W/DYE: CPT | Mod: MA

## 2022-03-09 PROCEDURE — 81001 URINALYSIS AUTO W/SCOPE: CPT

## 2022-03-09 PROCEDURE — 87186 SC STD MICRODIL/AGAR DIL: CPT

## 2022-03-09 PROCEDURE — A9585: CPT

## 2022-03-09 PROCEDURE — 85025 COMPLETE CBC W/AUTO DIFF WBC: CPT

## 2022-03-09 PROCEDURE — U0003: CPT

## 2022-03-09 PROCEDURE — 99284 EMERGENCY DEPT VISIT MOD MDM: CPT | Mod: 25

## 2022-03-09 PROCEDURE — 36415 COLL VENOUS BLD VENIPUNCTURE: CPT

## 2022-03-09 PROCEDURE — 80053 COMPREHEN METABOLIC PANEL: CPT

## 2022-03-09 PROCEDURE — 87086 URINE CULTURE/COLONY COUNT: CPT

## 2022-03-09 RX ORDER — OXYCODONE HYDROCHLORIDE 5 MG/1
5 TABLET ORAL ONCE
Refills: 0 | Status: DISCONTINUED | OUTPATIENT
Start: 2022-03-09 | End: 2022-03-09

## 2022-03-09 RX ADMIN — OXYCODONE HYDROCHLORIDE 5 MILLIGRAM(S): 5 TABLET ORAL at 00:26

## 2022-03-11 NOTE — ED POST DISCHARGE NOTE - RESULT SUMMARY
UCX Prelim: 50-99 gram + organisms not on abx. Will follow culture and determine further management - Codie Owens PA-C

## 2022-04-16 ENCOUNTER — EMERGENCY (EMERGENCY)
Facility: HOSPITAL | Age: 32
LOS: 1 days | Discharge: HOME | End: 2022-04-16
Attending: STUDENT IN AN ORGANIZED HEALTH CARE EDUCATION/TRAINING PROGRAM
Payer: COMMERCIAL

## 2022-04-16 VITALS
HEIGHT: 63 IN | WEIGHT: 227.96 LBS | TEMPERATURE: 98 F | OXYGEN SATURATION: 99 % | SYSTOLIC BLOOD PRESSURE: 112 MMHG | DIASTOLIC BLOOD PRESSURE: 79 MMHG | RESPIRATION RATE: 18 BRPM | HEART RATE: 95 BPM

## 2022-04-16 VITALS
HEART RATE: 100 BPM | DIASTOLIC BLOOD PRESSURE: 82 MMHG | OXYGEN SATURATION: 100 % | RESPIRATION RATE: 17 BRPM | TEMPERATURE: 98 F | SYSTOLIC BLOOD PRESSURE: 147 MMHG

## 2022-04-16 DIAGNOSIS — Z98.1 ARTHRODESIS STATUS: Chronic | ICD-10-CM

## 2022-04-16 LAB
ALBUMIN SERPL ELPH-MCNC: 4.1 G/DL — SIGNIFICANT CHANGE UP (ref 3.3–5)
ALP SERPL-CCNC: 93 U/L — SIGNIFICANT CHANGE UP (ref 40–120)
ALT FLD-CCNC: 22 U/L — SIGNIFICANT CHANGE UP (ref 10–45)
ANION GAP SERPL CALC-SCNC: 14 MMOL/L — SIGNIFICANT CHANGE UP (ref 5–17)
APPEARANCE UR: ABNORMAL
AST SERPL-CCNC: 21 U/L — SIGNIFICANT CHANGE UP (ref 10–40)
BACTERIA # UR AUTO: NEGATIVE — SIGNIFICANT CHANGE UP
BASOPHILS # BLD AUTO: 0.07 K/UL — SIGNIFICANT CHANGE UP (ref 0–0.2)
BASOPHILS NFR BLD AUTO: 0.7 % — SIGNIFICANT CHANGE UP (ref 0–2)
BILIRUB SERPL-MCNC: 0.2 MG/DL — SIGNIFICANT CHANGE UP (ref 0.2–1.2)
BILIRUB UR-MCNC: NEGATIVE — SIGNIFICANT CHANGE UP
BUN SERPL-MCNC: 11 MG/DL — SIGNIFICANT CHANGE UP (ref 7–23)
CALCIUM SERPL-MCNC: 9.4 MG/DL — SIGNIFICANT CHANGE UP (ref 8.4–10.5)
CHLORIDE SERPL-SCNC: 103 MMOL/L — SIGNIFICANT CHANGE UP (ref 96–108)
CO2 SERPL-SCNC: 22 MMOL/L — SIGNIFICANT CHANGE UP (ref 22–31)
COLOR SPEC: YELLOW — SIGNIFICANT CHANGE UP
CREAT SERPL-MCNC: 0.51 MG/DL — SIGNIFICANT CHANGE UP (ref 0.5–1.3)
DIFF PNL FLD: NEGATIVE — SIGNIFICANT CHANGE UP
EGFR: 127 ML/MIN/1.73M2 — SIGNIFICANT CHANGE UP
EOSINOPHIL # BLD AUTO: 0.2 K/UL — SIGNIFICANT CHANGE UP (ref 0–0.5)
EOSINOPHIL NFR BLD AUTO: 2 % — SIGNIFICANT CHANGE UP (ref 0–6)
EPI CELLS # UR: 16 /HPF — HIGH
GLUCOSE SERPL-MCNC: 87 MG/DL — SIGNIFICANT CHANGE UP (ref 70–99)
GLUCOSE UR QL: NEGATIVE — SIGNIFICANT CHANGE UP
HCT VFR BLD CALC: 36.4 % — SIGNIFICANT CHANGE UP (ref 34.5–45)
HGB BLD-MCNC: 11.2 G/DL — LOW (ref 11.5–15.5)
HYALINE CASTS # UR AUTO: 13 /LPF — HIGH (ref 0–2)
IMM GRANULOCYTES NFR BLD AUTO: 0.3 % — SIGNIFICANT CHANGE UP (ref 0–1.5)
KETONES UR-MCNC: NEGATIVE — SIGNIFICANT CHANGE UP
LEUKOCYTE ESTERASE UR-ACNC: NEGATIVE — SIGNIFICANT CHANGE UP
LYMPHOCYTES # BLD AUTO: 2.89 K/UL — SIGNIFICANT CHANGE UP (ref 1–3.3)
LYMPHOCYTES # BLD AUTO: 29.4 % — SIGNIFICANT CHANGE UP (ref 13–44)
MCHC RBC-ENTMCNC: 25.9 PG — LOW (ref 27–34)
MCHC RBC-ENTMCNC: 30.8 GM/DL — LOW (ref 32–36)
MCV RBC AUTO: 84.3 FL — SIGNIFICANT CHANGE UP (ref 80–100)
MONOCYTES # BLD AUTO: 0.68 K/UL — SIGNIFICANT CHANGE UP (ref 0–0.9)
MONOCYTES NFR BLD AUTO: 6.9 % — SIGNIFICANT CHANGE UP (ref 2–14)
NEUTROPHILS # BLD AUTO: 5.95 K/UL — SIGNIFICANT CHANGE UP (ref 1.8–7.4)
NEUTROPHILS NFR BLD AUTO: 60.7 % — SIGNIFICANT CHANGE UP (ref 43–77)
NITRITE UR-MCNC: NEGATIVE — SIGNIFICANT CHANGE UP
NRBC # BLD: 0 /100 WBCS — SIGNIFICANT CHANGE UP (ref 0–0)
NT-PROBNP SERPL-SCNC: 30 PG/ML — SIGNIFICANT CHANGE UP (ref 0–300)
PH UR: 7.5 — SIGNIFICANT CHANGE UP (ref 5–8)
PLATELET # BLD AUTO: 400 K/UL — SIGNIFICANT CHANGE UP (ref 150–400)
POTASSIUM SERPL-MCNC: 4.3 MMOL/L — SIGNIFICANT CHANGE UP (ref 3.5–5.3)
POTASSIUM SERPL-SCNC: 4.3 MMOL/L — SIGNIFICANT CHANGE UP (ref 3.5–5.3)
PROT SERPL-MCNC: 7.2 G/DL — SIGNIFICANT CHANGE UP (ref 6–8.3)
PROT UR-MCNC: ABNORMAL
RBC # BLD: 4.32 M/UL — SIGNIFICANT CHANGE UP (ref 3.8–5.2)
RBC # FLD: 13.8 % — SIGNIFICANT CHANGE UP (ref 10.3–14.5)
RBC CASTS # UR COMP ASSIST: 17 /HPF — HIGH (ref 0–4)
SODIUM SERPL-SCNC: 139 MMOL/L — SIGNIFICANT CHANGE UP (ref 135–145)
SP GR SPEC: 1.03 — HIGH (ref 1.01–1.02)
UROBILINOGEN FLD QL: NEGATIVE — SIGNIFICANT CHANGE UP
WBC # BLD: 9.82 K/UL — SIGNIFICANT CHANGE UP (ref 3.8–10.5)
WBC # FLD AUTO: 9.82 K/UL — SIGNIFICANT CHANGE UP (ref 3.8–10.5)
WBC UR QL: 8 /HPF — HIGH (ref 0–5)

## 2022-04-16 PROCEDURE — 87086 URINE CULTURE/COLONY COUNT: CPT

## 2022-04-16 PROCEDURE — 83880 ASSAY OF NATRIURETIC PEPTIDE: CPT

## 2022-04-16 PROCEDURE — 72131 CT LUMBAR SPINE W/O DYE: CPT | Mod: MA

## 2022-04-16 PROCEDURE — 99285 EMERGENCY DEPT VISIT HI MDM: CPT | Mod: 25

## 2022-04-16 PROCEDURE — 96374 THER/PROPH/DIAG INJ IV PUSH: CPT

## 2022-04-16 PROCEDURE — 81001 URINALYSIS AUTO W/SCOPE: CPT

## 2022-04-16 PROCEDURE — 99285 EMERGENCY DEPT VISIT HI MDM: CPT

## 2022-04-16 PROCEDURE — 85025 COMPLETE CBC W/AUTO DIFF WBC: CPT

## 2022-04-16 PROCEDURE — 80053 COMPREHEN METABOLIC PANEL: CPT

## 2022-04-16 PROCEDURE — 93005 ELECTROCARDIOGRAM TRACING: CPT

## 2022-04-16 PROCEDURE — 36415 COLL VENOUS BLD VENIPUNCTURE: CPT

## 2022-04-16 PROCEDURE — 93970 EXTREMITY STUDY: CPT

## 2022-04-16 PROCEDURE — 72131 CT LUMBAR SPINE W/O DYE: CPT | Mod: 26,MA

## 2022-04-16 PROCEDURE — 93970 EXTREMITY STUDY: CPT | Mod: 26

## 2022-04-16 RX ORDER — LIDOCAINE 4 G/100G
1 CREAM TOPICAL ONCE
Refills: 0 | Status: COMPLETED | OUTPATIENT
Start: 2022-04-16 | End: 2022-04-16

## 2022-04-16 RX ORDER — ACETAMINOPHEN 500 MG
1000 TABLET ORAL ONCE
Refills: 0 | Status: COMPLETED | OUTPATIENT
Start: 2022-04-16 | End: 2022-04-16

## 2022-04-16 RX ADMIN — LIDOCAINE 1 PATCH: 4 CREAM TOPICAL at 21:34

## 2022-04-16 RX ADMIN — Medication 1000 MILLIGRAM(S): at 17:00

## 2022-04-16 RX ADMIN — Medication 400 MILLIGRAM(S): at 16:03

## 2022-04-16 NOTE — ED PROVIDER NOTE - NSFOLLOWUPCLINICS_GEN_ALL_ED_FT
Neurosurgery at [place]  Neurosurgery  [address], [address]  [place] [address]  Phone: [phone]  Fax:     [facility] - Primary Care  Primary Care  [address] [place] [address]  Phone: [phone]  Fax:

## 2022-04-16 NOTE — ED PROVIDER NOTE - ATTENDING CONTRIBUTION TO CARE
I, Amy Raymundo, performed a history and physical exam of the patient and discussed their management with the resident and /or advanced care provider. I reviewed the resident and /or ACP's note and agree with the documented findings and plan of care except where otherwise noted in my note. I was present and available for all procedures.     30 yo F hx Lumbarectomy L5-S1 Fusion performed by Dr. Rich Yarbrough at [facility] 2/28/22 presents after she tripped, causing increased pain at surgical site. Did not fall to the ground, no head trauma. Was able to brace herself. Has had chronic R hip/thigh swelling/numbness/tingling since surgery which she feels may be a bit worse since the trip. No new bowel bladder incontinence, no new weakness. Is ambulatory. Main concern at this time is the back pain and concern for her hardware. Had steroid injections a few days ago for pain in the back we well. No fevers, no systemic steroid use, no IVDU. Also states her legs have been swollen since surgery and can no longer put her shoes on. Seen here in March for MRI spine given R buttox/vaginal numbness - noted to have post op changes with compression of thecal sac and enhancement of cauda equina and was discharged to f/u with spine surgeon.    On exam, patient appears uncomfortable but nontoxic. Well healed surgical scars in vertical orientation around lumbar area with diffuse lumbar tenderness (spinal and paraspinal), strength 4/5 (not new since fall) in b/l LE. Sensation diminished to light touch in posterior R thigh (not new since fall). Able to ambulate. Noted to have 1+ pitting edema to b/l LE as well as calf tenderness to RLE.     MDM: hx and physical as noted above. spoke with neurosurgery. CT lumbar spine will be best to eval hardware. low suspicion at this time for epidural abscess/hematoma. Will get duplex RLE to eval for dvt given recent surgery, leg pain, calf tenderness, edema. WIll get screening proBNP as well given LE edema although low susppicion fluid overload 2/2 renal or cardiac etiology. Will consult neurosurgery, pain control prn, dispo pending I, Amy Raymundo, performed a history and physical exam of the patient and discussed their management with the resident and /or advanced care provider. I reviewed the resident and /or ACP's note and agree with the documented findings and plan of care except where otherwise noted in my note. I was present and available for all procedures.     32 yo F hx Lumbarectomy L5-S1 Fusion performed by Dr. Rich Yarbrough at [facility] 2/28/22 presents after she tripped, causing increased pain at surgical site. Did not fall to the ground, no head trauma. Was able to brace herself. Has had chronic R hip/thigh swelling/numbness/tingling since surgery which she feels may be a bit worse since the trip. No new bowel bladder incontinence, no new weakness. Is ambulatory. Main concern at this time is the back pain and concern for her hardware. Had steroid injections a few days ago for pain in the back we well. No fevers, no systemic steroid use, no IVDU. Also states her legs have been swollen since surgery and can no longer put her shoes on. Seen here in March for MRI spine given R buttox/vaginal numbness - noted to have post op changes with compression of thecal sac and enhancement of cauda equina and was discharged to f/u with spine surgeon.    On exam, patient appears uncomfortable but nontoxic. Well healed surgical scars in vertical orientation around lumbar area with diffuse lumbar tenderness (spinal and paraspinal), strength 4/5 (not new since fall, limited 2/2 pain) in b/l LE. Sensation diminished to light touch in posterior R thigh (not new since fall). Able to ambulate. Noted to have 1+ pitting edema to b/l LE as well as calf tenderness to RLE.     MDM: hx and physical as noted above. spoke with neurosurgery. CT lumbar spine will be best to eval hardware. low suspicion at this time for epidural abscess/hematoma. Will get duplex RLE to eval for dvt given recent surgery, leg pain, calf tenderness, edema. WIll get screening proBNP as well given LE edema although low susppicion fluid overload 2/2 renal or cardiac etiology. Will consult neurosurgery, pain control prn, dispo pending

## 2022-04-16 NOTE — ED PROVIDER NOTE - PROGRESS NOTE DETAILS
Attending MD Monique.  PT signed out to me in stable condition pending CT spine, RLE duplex, dispo per findings/pain control, 32 yo fem with pmhx of L5/S1 discectomy/fusion at [facility] end Feb, today tripped, strained, concern re: hardware, no fall to floor, LE numbness/pain since surg, NSG cxed. Ford, PGY3 - pt cleared for DC by neurosurgery, no fx on CTs, no DVT on US. pt reassessed, ambulating w/ stable gait in the ED independently. discussed plan for lidocaine patch for pain (pt relates she was diagnosed with piriformis syndrome after surgery), return precautions, f/u with her neurosurgeon at [facility]. pt and  agree with plan all questions answered

## 2022-04-16 NOTE — ED PROVIDER NOTE - OBJECTIVE STATEMENT
Patient is a 32 y/o F w/ PMH of Lumbarectomy L5-S1 Fusion performed by Dr. Rich Yarbrough at [facility] 2/28/22 who presents with back pain. Patient was in Kitchen this AM, slipped on mat, fell down but grabbed the tables next to her. Did not fall onto her back but felt intense lower back pain around area of prev procedure. At presentation to ED, pain has improved. No chest pain, palpitations, n/v, f/c, numbness or tingling in legs. Patient has chronic b/l lower extremity edema since her procedure. Follows with pain management, had steroid injections 4 days ago. Was seen in this ED for back pain, MR w/ some enhancement of cauda equina. Did not follow w/ NS. No urinary or bowel changes. Had not taken anything for pain today. Take oxy at home.

## 2022-04-16 NOTE — ED PROVIDER NOTE - NSFOLLOWUPINSTRUCTIONS_ED_ALL_ED_FT
Rest and stay well hydrated.    Follow-up with your Neurosurgeon at [facility] for further evaluation of your back pain - call tomorrow to discuss.     Follow-up with your Pain Management specialist for further evaluation of your back pain - call tomorrow to discuss.     Consider lidocaine patch for back pain - use as directed, see medication warnings.    Return to the ED for any worsening pain, urinary or bladder incontinence, worsening numbness, weakness, fevers, or any new concerning symptoms.

## 2022-04-16 NOTE — ED PROVIDER NOTE - PHYSICAL EXAMINATION
General: Alert and Orientated x 3. No apparent distress.  Head: Normocephalic and atraumatic.  Eyes: PERRLA with EOMI.  Neck: Supple. Trachea midline.   Cardiac: Normal S1 and S2 w/ RRR. No murmurs appreciated.   Pulmonary: CTA bilaterally. No increased WOB. No wheezes or crackles.  Abdominal: Soft, non-tender. (+) bowel sounds appreciated in all 4 quadrants. No hepatosplenomegaly.   Neurologic: No focal sensory or motor deficits. CN2-12 grossly  intact. Gait normal.   Musculoskeletal: Strength appropriate in all 4 extremities for age with no limited ROM. back: well-healed surgical scars. TTP around l5-s1. No thoracici or cervical ttp.   Skin: Color appropriate for race. Intact, warm, and well-perfused.  Psychiatric: Appropriate mood and affect. No apparent risk to self or others.

## 2022-04-16 NOTE — CONSULT NOTE ADULT - SUBJECTIVE AND OBJECTIVE BOX
p ([id number])     HPI: 31F past medical history of L5-S1 laminectomy and fusion with Dr. Rich Yarbrough at [facility] 2/28. Developed right groin/RLE numbness/weakness and increased pain postoperatively. Visited ED at [facility], MRI on 3/8 showed L5-S1 paraspinal and deep postop collections w/ possible concern for cauda equina enhancement and compression in the setting of postoperative changes.    Presents today with mild back pain and anxiety after stumbling on feet. She states that her postop RLE/groin symptoms have been improving. She denies bowel or bladder incontinence. Does not look infectious.     Imaging:  CT L-spine 4/16 no evidence of deep collection, hardware intact.     Exam: Pain pang exam, RHF 4+/5, b/l DF 4+/5, dec sensation b/l ankles R>L, rectal tone wnl, no clonus    --Anticoagulation:    =====================  PAST MEDICAL HISTORY   No pertinent past medical history    Back pain      PAST SURGICAL HISTORY   No significant past surgical history    S/P lumbar spinal fusion      muscle relaxants cause syncope, hallucinations (Other)      MEDICATIONS:  Antibiotics:    Neuro:    Other:      SOCIAL HISTORY:   Occupation:   Marital Status:     FAMILY HISTORY:  No pertinent family history in first degree relatives        ROS: Negative except per HPI    LABS:                          11.2   9.82  )-----------( 400      ( 16 Apr 2022 16:30 )             36.4     04-16    139  |  103  |  11  ----------------------------<  87  4.3   |  22  |  0.51    Ca    9.4      16 Apr 2022 16:30    TPro  7.2  /  Alb  4.1  /  TBili  0.2  /  DBili  x   /  AST  21  /  ALT  22  /  AlkPhos  93  04-16

## 2022-04-16 NOTE — CONSULT NOTE ADULT - ASSESSMENT
31F PMHx L5-S1 lami/fusion [facility] 2/28, had R groin/RLE numbness/weakness/pain postop, MRI 3/8 L5-S1 paraspinal and deep postop collections w/ c/f cauda equina enhancement and compression. Presents today w/ mild back pain/anxiety after stumbling on feet, states postop RLE/groin symptoms are improving. Denies incontinence. No e/o infection. CT L-spine 4/16 no evidence of deep collection, hardware intact. BUE 5/5, lower extremity exam pain limited, with more pain R>L; RHF 4+/5, b/l DF 4+/5 in part secondary to swelling, dec sensation b/l ankles R>L, rectal tone wnl, no clonus  -reports legs have been swollen since surgery, recommend LE dopplers to eval for dvt  -reports right lower extremity sensation and pain have been improving since the last visit 3/8  -no acute neurosurgical intervention, recommend outpatient follow with [facility] neurosurgeon Dr. Rich Yarbrough at scheduled appt in 2 weeks

## 2022-04-16 NOTE — ED PROVIDER NOTE - PATIENT PORTAL LINK FT
You can access the FollowMyHealth Patient Portal offered by [facility] by registering at the following website: [email or web address]. By joining [employer]’s FollowMyHealth portal, you will also be able to view your health information using other applications (apps) compatible with our system.

## 2022-04-16 NOTE — ED ADULT NURSE NOTE - DRUG PRE-SCREENING (DAST -1)
Letter by Anna Calzada PA-C at      Author: Anna Calzada PA-C Service: -- Author Type: --    Filed:  Encounter Date: 11/18/2019 Status: Signed         Marleny Viera  [address]  [facility] [address]             November 18, 2019         Dear Ms. Viera,    Below are the results from your recent visit:      Still normal A1C- close to diabetic range.  Diet rich in vegetables, lean meat, low carbohydrate recommended.      Please call with questions or contact us using [facility].    Sincerely,        Electronically signed by Anna Calzada PA-C       
none
Statement Selected

## 2022-04-16 NOTE — ED PROVIDER NOTE - NS ED ROS FT
CONSTITUTIONAL: No fevers, no chills, no lightheadedness, no dizziness  EYES: no visual changes, no eye pain  EARS: no ear drainage, no ear pain, no change in hearing  NOSE: no nasal congestion  MOUTH/THROAT: no sore throat  CV: No chest pain, no palpitations  RESP: No SOB, no cough  GI: No n/v/d, no abd pain  : no dysuria, no hematuria, no flank pain  MSK: + back pain, no extremity pain  SKIN: no rashes  NEURO: no headache, no focal weakness, no decreased sensation/parasthesias   PSYCHIATRIC: no known mental health issues

## 2022-04-16 NOTE — ED PROVIDER NOTE - CLINICAL SUMMARY MEDICAL DECISION MAKING FREE TEXT BOX
32 y/o s/p Lumbarectomy L5-S1 Fusion performed by [facility] 2/28/22. P/w with back pain s/p fall. No red flag symptoms. Pain has improved since presentation. Has chronic LE edema, not evaluated. Vitals stable. Exam as above. Will get CT lumbar to eval for hardware. Will get basic labs, bnp, and US b/l le to r/o dvt. NS consult if indicated. Dispo -pending labs and imaging.

## 2022-04-16 NOTE — ED ADULT NURSE NOTE - OBJECTIVE STATEMENT
31F aaox4 ambulatory came with family from home with c/o back pain, left medial side s/p mechanical tripped and almost fell while in the kitchen. Patient reports that she tripped on a crpet and caughtherself but cause pain in the left nmedial side where she had her spinal fusion 2 years ago. No signs of trauma noted, VS  wdl. Pending to be seen by MD.

## 2022-04-18 LAB
CULTURE RESULTS: SIGNIFICANT CHANGE UP
SPECIMEN SOURCE: SIGNIFICANT CHANGE UP

## 2022-04-27 NOTE — ED PROVIDER NOTE - NSTIMEPROVIDERCAREINITIATE_GEN_ER
Glyco normal.  Urine does show a small amount of protein that has been stable.  Can see Nephrology to determine why.   05-Mar-2022 17:22

## 2023-08-03 NOTE — ED PROVIDER NOTE - CROS ED RESP ALL NEG
Anna Butterfield discharged to home accompanied by .   Patient provided with the following educational materials upon discharge:  See AVS.   Valuables and belongings sent with patient.   discharge summary, discharge instructions, medications and follow up appointments reviewed with patient and .  Patient and  verbalized understanding   negative...

## 2023-08-26 NOTE — ED PROVIDER NOTE - NS ED ATTENDING STATEMENT MOD
Attending Only PAST MEDICAL HISTORY:  Fibromyalgia     HLD (hyperlipidemia)     HLD (hyperlipidemia)     Migraines     Migraines     Premature atrial complex     Smokeless tobacco use     Ventricular premature beats

## 2024-02-14 NOTE — ED PROVIDER NOTE - NSDCPRINTRESULTS_ED_ALL_ED
History     Chief Complaint:  Hypertension     The history is provided by the patient.      Coretta Kolb is a 75 year old female with history of hypertension and type 2 diabetes who presents to the ED via EMS with her daughter for evaluation of hypertension. The patient reports that she went to the eye doctor for a right eye injection and she got her right eye dilated. While at the eye clinic, her doctor noticed that the patient wasn't acting her normal self but patient denies feeling depressed or confused. Patient does note that she hasn't been eating well or taking care of her diabetes, and that she has been drinking lots of water. Patient's daughter states that the patient has been confused, slow to respond to questions, more unsteady than baseline, and stares off into space, which patient denies. Patient's daughter reports that other people have also noticed that the patient is not taking care of herself. Coretta states she is independent and was able to walk today and drove herself to her eye appointment. Daughter adds that the patient was here a year ago for a leg issue and went to transitional care after the hospital. She was then moved to an assisted living facility, but daughter reports that the patient moved herself out of the facility and back into her two-story [facility] home. Patient endorses rhinorrhea but daughter notes that this is constant. Denies fever, chills, cough, sore throat, chest pain, trouble breathing, nausea, vomiting, abdominal pain, dysuria, hematuria, diarrhea, black or bloody stool, headache, dizziness, numbness or weakness, recent head injury, or blood thinners use.    Review of Systems   Constitutional:  Negative for chills and fever.   HENT:  Positive for rhinorrhea.    Respiratory:  Negative for cough and shortness of breath.    Cardiovascular:  Negative for chest pain.   Gastrointestinal:  Negative for abdominal pain, diarrhea, nausea and vomiting.   Genitourinary:  Positive for  "frequency. Negative for dysuria and hematuria.   Musculoskeletal:  Negative for back pain and neck pain.   Neurological:  Negative for dizziness, weakness, numbness and headaches.       Independent Historian:   Daughter - They report as noted above.    Review of External Notes:   FM telephone encounter note from today       Medications:    Norvasc  Cymbalta  Gabapentin   Humalog Kwikpen   Synthroid  Zestril  Lantus  Crestor  Lamisil  Aspirin 81 mg  Tenormin  Glucotrol   Prinzide   Metformin   Zyloprim   Xopenex  Lasix   Jardiance     Past Medical History:    Type 2 diabetes  Hypertension  Hypothyroidism   Obesity   Plantar fascial fibromatosis  Edema  Articular cartilage disorder  Thyroiditis  Pulmonary nodules   Adjustment disorder  Osteoarthritis  Basal cell carcinoma of face   Corneal dystrophy   Age-related nuclear cataract of both eyes  Cortical age-related cataract of both eyes  Sensorineural hearing loss  Dyslipidemia  Microalbuminuria  Diabetic neuropathy   Lichen sclerosus  Tailor's bunion  Anxiety  Lumbar radiculopathy  Enthesopathy   Depression   Diabetic retinopathy   Arthritis     Past Surgical History:    Anesthesia out of OR lumbar MRI  Orthopedic surgery  Right subtalar arthroereisis x4  Left subtalar arthroereisis   Bilateral knee arthroscopy  Endometrial biopsy   Ligate fallopian tube  Hysteroscopy and dilation and curettage  Back surgery for herniated lumbar disc and spinal stenosis   Echocardiogram   Colonoscopy     Physical Exam   Patient Vitals for the past 24 hrs:   BP Temp Temp src Pulse Resp SpO2 Height Weight   02/14/24 0025 (!) 175/105 -- -- -- 20 95 % -- --   02/13/24 2000 (!) 142/65 -- -- -- -- -- -- --   02/13/24 1706 (!) 193/83 98.2  F (36.8  C) Oral 70 18 95 % 1.549 m (5' 1\") 99.8 kg (220 lb)      Constitutional:       General: Not in acute distress.     Appearance: Normal appearance.   HENT:      Head: Normocephalic and atraumatic.   Eyes:      Extraocular Movements: Extraocular " movements intact.      Conjunctiva/sclera: Conjunctivae normal.      Pupils: Right pupil dilated greater than left.  Right pupil dilated, but reactive to light.  Patient underwent pupil dilation for retinal examination today.  Cardiovascular:      Rate and Rhythm: Regular rate and rhythm.  Pulmonary:      Effort: Pulmonary effort is normal. No respiratory distress.      Breath sounds: Normal breath sounds.  Abdominal:      General: Abdomen is flat. There is no distension.      Palpations: Abdomen is soft.      Tenderness: There is no abdominal tenderness.   Musculoskeletal:      Cervical back: Normal range of motion. No rigidity.      Right lower leg: No edema.      Left lower leg: No edema.   Skin:     General: Skin is warm and dry.   Neurological:      General: No focal deficit present.      Mental Status: Alert and oriented to person, place, and time.   Psychiatric:         Mood and Affect: Mood normal.         Behavior: Behavior normal.    Emergency Department Course     Imaging:  CT Head w/o Contrast   Final Result   IMPRESSION:   1.  No acute intracranial process.         Report per radiology    Laboratory:  Labs Ordered and Resulted from Time of ED Arrival to Time of ED Departure   ROUTINE UA WITH MICROSCOPIC REFLEX TO CULTURE - Abnormal       Result Value    Color Urine Light Yellow      Appearance Urine Clear      Glucose Urine >=1000 (*)     Bilirubin Urine Negative      Ketones Urine Negative      Specific Gravity Urine 1.020      Blood Urine Small (*)     pH Urine 6.0      Protein Albumin Urine 300 (*)     Urobilinogen Urine Normal      Nitrite Urine Negative      Leukocyte Esterase Urine Trace (*)     Budding Yeast Urine Few (*)     Mucus Urine Present (*)     RBC Urine 12 (*)     WBC Urine 28 (*)     Squamous Epithelials Urine <1      Hyaline Casts Urine 16 (*)    BASIC METABOLIC PANEL - Abnormal    Sodium 134 (*)     Potassium 3.6      Chloride 96 (*)     Carbon Dioxide (CO2) 28      Anion Gap 10       Urea Nitrogen 10.4      Creatinine 0.72      GFR Estimate 86      Calcium 9.5      Glucose 431 (*)    GLUCOSE BY METER - Abnormal    GLUCOSE BY METER POCT 351 (*)    GLUCOSE BY METER - Abnormal    GLUCOSE BY METER POCT 508 (*)    HEPATIC FUNCTION PANEL - Normal    Protein Total 7.1      Albumin 3.6      Bilirubin Total 0.3      Alkaline Phosphatase 147      AST 16      ALT 12      Bilirubin Direct <0.20     INFLUENZA A/B, RSV, & SARS-COV2 PCR - Normal    Influenza A PCR Negative      Influenza B PCR Negative      RSV PCR Negative      SARS CoV2 PCR Negative     CBC WITH PLATELETS AND DIFFERENTIAL    WBC Count 8.9      RBC Count 5.11      Hemoglobin 15.1      Hematocrit 44.4      MCV 87      MCH 29.5      MCHC 34.0      RDW 13.4      Platelet Count 256      % Neutrophils 61      % Lymphocytes 26      % Monocytes 8      % Eosinophils 4      % Basophils 1      % Immature Granulocytes 0      NRBCs per 100 WBC 0      Absolute Neutrophils 5.5      Absolute Lymphocytes 2.3      Absolute Monocytes 0.7      Absolute Eosinophils 0.3      Absolute Basophils 0.1      Absolute Immature Granulocytes 0.0      Absolute NRBCs 0.0     URINE CULTURE      Emergency Department Course & Assessments:     Interventions:  Medications   cefTRIAXone (ROCEPHIN) 1 g vial to attach to  mL bag for ADULTS or NS 50 mL bag for PEDS (1 g Intravenous $New Bag 2/14/24 0021)      Independent Interpretation (X-rays, CTs, rhythm strip):  None    Assessments/Consultations/Discussion of Management or Tests:  ED Course as of 02/14/24 0323   Tue Feb 13, 2024 2033 I obtained history and examined the patient as noted above.    2247 UA with Microscopic reflex to Culture(!)  Possible UTI   Wed Feb 14, 2024   0002 I rechecked and updated the patient. Patient's daughter concerned about patient's safety at home and does not feel comfortable with discharge patient home. Daughter unable to take patient home with her and unable to go stay with the patient  either. Daughter prefers patient to be admitted under observation admission.   0044 I rechecked and updated the patient. Patient and family requests observation admission.   0056 I spoke with hospitalist Dr. Krueger regarding admission.      Social Determinants of Health affecting care:   None    Disposition:  The patient was admitted to the hospital under the care of Dr. Krueger.     Impression & Plan    Medical Decision Makin-year-old female as described above presents to the emergency department for what family and I clinic today was concerned regarding increased confusion and difficulty with balance.  Patient reports that she actually drove herself to the clinic, but clinic felt that patient was just unsteady enough and confused enough that they do not feel comfortable with the patient independently driving herself to the ER and call for medical transportation.  Patient hemodynamically stable at time evaluation.  Afebrile.  Alert and oriented.  Daughter however is concerned that patient is confused.  While patient states that she did have pupillary dilation today at clinic, given some concern regarding reliability, will obtain CT head for evaluation for acute intracranial processes.  Confusion/altered mental status type workup.  Infectious workup.  Urine analysis for evaluation for UTI.  Viral swab for evaluation for infectious source from acute viral syndrome.  Disposition pending workup and repeat evaluation of mental status.  Discussed care plan with patient and daughter at bedside who voiced understanding and agreement with plan.  Answered all questions.  Additional workup and orders as listed in chart.    Please refer to ED course above as part of continuation of MDM for details on the patient's treatment course and any changes or updates in care plan beyond my initial evaluation and MDM creation.    Diagnosis:    ICD-10-CM    1. Urinary tract infection without hematuria, site unspecified  N39.0       2.  Confusion  R41.0       3. Hyperglycemia  R73.9          Discharge Medications:  New Prescriptions    No medications on file      Scribe Disclosure:  I, MONROE DOMINGUEZ, am serving as a scribe at 8:07 PM on 2/13/2024 to document services personally performed by Lizandro Buckner DO based on my observations and the provider's statements to me.     2/13/2024   Lizandro Buckner DO Yeh, Ferris, DO  02/14/24 0323     Patient requests all Lab, Cardiology, and Radiology Results on their Discharge Instructions

## 2024-04-02 NOTE — ED PROVIDER NOTE - MEDICAL DECISION MAKING DETAILS
Continue current medications including Lasix 20 mg daily.    Consider establishing with nutritionist.   Refill of metoprolol sent to pharmacy.     Please weigh yourself every day (after emptying your bladder) and keep a detailed log of weights.   Contact the Heart Failure program at [phone] if you gain 3+ lbs overnight or 5+ lbs in 5-7 days.  Limit daily sodium/salt intake to 2000 mg daily to prevent fluid retention.  Avoid canned foods, fast food/Chinese food, and processed meats (hot dogs, lunch meat, and sausage etc.). Caution with condiments.  Limit fluid intake to 2000 mL or 2 liters (about 60-65 ounces) daily.  Avoid electrolyte replacement drinks (such as Gatorade, Pedialyte, Propel, Liquid IV, etc.).  Bring complete list of medications and log of daily weights to your follow-up appointment.   27yo F A0 LMP 17 6 WGA presenting for vaginal bleeding starting this morning. plan: r/o miscarriag--labs, sono, IV fluids, reassess

## 2024-06-03 NOTE — ED PROVIDER NOTE - CHIEF COMPLAINT
----- Message from Feli Jauregui MD sent at 6/2/2024  9:49 PM EDT -----  Please notify patient: Sessile serrated and tubular adenomas are on a pre-cancerous spectrum. Repeat interval for colonoscopy is 3 years.     The patient is a 26y Female complaining of

## 2024-11-25 ENCOUNTER — EMERGENCY (EMERGENCY)
Facility: HOSPITAL | Age: 34
LOS: 1 days | Discharge: HOME | End: 2024-11-25
Attending: EMERGENCY MEDICINE | Admitting: EMERGENCY MEDICINE
Payer: COMMERCIAL

## 2024-11-25 VITALS
SYSTOLIC BLOOD PRESSURE: 133 MMHG | HEART RATE: 93 BPM | OXYGEN SATURATION: 99 % | TEMPERATURE: 98 F | WEIGHT: 229.06 LBS | DIASTOLIC BLOOD PRESSURE: 87 MMHG | RESPIRATION RATE: 16 BRPM

## 2024-11-25 DIAGNOSIS — Z98.1 ARTHRODESIS STATUS: Chronic | ICD-10-CM

## 2024-11-25 PROCEDURE — 99284 EMERGENCY DEPT VISIT MOD MDM: CPT

## 2024-11-25 PROCEDURE — 72100 X-RAY EXAM L-S SPINE 2/3 VWS: CPT | Mod: 26

## 2024-11-25 RX ORDER — ACETAMINOPHEN 500 MG
650 TABLET ORAL ONCE
Refills: 0 | Status: COMPLETED | OUTPATIENT
Start: 2024-11-25 | End: 2024-11-25

## 2024-11-25 RX ORDER — IBUPROFEN 200 MG
400 TABLET ORAL ONCE
Refills: 0 | Status: COMPLETED | OUTPATIENT
Start: 2024-11-25 | End: 2024-11-25

## 2024-11-25 RX ADMIN — Medication 400 MILLIGRAM(S): at 11:01

## 2024-11-25 RX ADMIN — Medication 650 MILLIGRAM(S): at 11:01
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
## 2024-11-25 NOTE — ED PROVIDER NOTE - NSFOLLOWUPINSTRUCTIONS_ED_ALL_ED_FT
you can consider following with the Osteopathic manipulative medicine clinic at [facility]  ([facility], [facility], [facility], [facility], and [facility])   [place]   [address]   [place] [address]   [phone]    You may take 400mg of ibuprofen (example: motrin or advil) every 4-6 hours for baseline pain control as indicated with respect to the warnings on the label. This is an over the counter medication.    You may take 1000mg of Tylenol every 6 hours for baseline pain control with respect to the warnings on the label.    heating pads and warm soaks will help the muscle pain as well. you can consider following with the Osteopathic manipulative medicine clinic at [facility]  ([facility], [facility], [facility], [facility], and [facility])   [facility] [address]   [place] [address]   [phone]    Results of your x-rays are reassuring.  You can follow-up with your doctor outpatient.  In the meantime for the pain you should expect that it will get worse tomorrow and then the next day.  It can be helpful to be in a warm bath or use heating pads.    You may take 400mg of ibuprofen (example: motrin or advil) every 4-6 hours for baseline pain control as indicated with respect to the warnings on the label. This is an over the counter medication.    You may take 1000mg of Tylenol every 6 hours for baseline pain control with respect to the warnings on the label.    heating pads and warm soaks will help the muscle pain as well.

## 2024-11-25 NOTE — ED PROVIDER NOTE - PATIENT PORTAL LINK FT
You can access the FollowMyHealth Patient Portal offered by [facility] by registering at the following website: [email or web address]. By joining [facility]’s FollowMyHealth portal, you will also be able to view your health information using other applications (apps) compatible with our system.

## 2025-06-25 ENCOUNTER — EMERGENCY (EMERGENCY)
Facility: HOSPITAL | Age: 35
LOS: 1 days | End: 2025-06-25
Attending: STUDENT IN AN ORGANIZED HEALTH CARE EDUCATION/TRAINING PROGRAM
Payer: COMMERCIAL

## 2025-06-25 VITALS
OXYGEN SATURATION: 99 % | TEMPERATURE: 98 F | SYSTOLIC BLOOD PRESSURE: 126 MMHG | DIASTOLIC BLOOD PRESSURE: 57 MMHG | RESPIRATION RATE: 18 BRPM | HEART RATE: 74 BPM

## 2025-06-25 VITALS
WEIGHT: 212.97 LBS | DIASTOLIC BLOOD PRESSURE: 84 MMHG | RESPIRATION RATE: 19 BRPM | OXYGEN SATURATION: 98 % | SYSTOLIC BLOOD PRESSURE: 117 MMHG | TEMPERATURE: 98 F | HEART RATE: 84 BPM | HEIGHT: 63 IN

## 2025-06-25 DIAGNOSIS — Z98.1 ARTHRODESIS STATUS: Chronic | ICD-10-CM

## 2025-06-25 LAB
ALBUMIN SERPL ELPH-MCNC: 4 G/DL — SIGNIFICANT CHANGE UP (ref 3.3–5)
ALP SERPL-CCNC: 74 U/L — SIGNIFICANT CHANGE UP (ref 40–120)
ALT FLD-CCNC: 16 U/L — SIGNIFICANT CHANGE UP (ref 10–45)
ANION GAP SERPL CALC-SCNC: 9 MMOL/L — SIGNIFICANT CHANGE UP (ref 5–17)
APPEARANCE UR: CLEAR — SIGNIFICANT CHANGE UP
APTT BLD: 33.8 SEC — SIGNIFICANT CHANGE UP (ref 26.1–36.8)
AST SERPL-CCNC: 14 U/L — SIGNIFICANT CHANGE UP (ref 10–40)
BACTERIA # UR AUTO: NEGATIVE /HPF — SIGNIFICANT CHANGE UP
BASOPHILS # BLD AUTO: 0.06 K/UL — SIGNIFICANT CHANGE UP (ref 0–0.2)
BASOPHILS NFR BLD AUTO: 0.8 % — SIGNIFICANT CHANGE UP (ref 0–2)
BILIRUB SERPL-MCNC: 0.2 MG/DL — SIGNIFICANT CHANGE UP (ref 0.2–1.2)
BILIRUB UR-MCNC: NEGATIVE — SIGNIFICANT CHANGE UP
BLD GP AB SCN SERPL QL: NEGATIVE — SIGNIFICANT CHANGE UP
BUN SERPL-MCNC: 9 MG/DL — SIGNIFICANT CHANGE UP (ref 7–23)
CALCIUM SERPL-MCNC: 9.3 MG/DL — SIGNIFICANT CHANGE UP (ref 8.4–10.5)
CAST: 0 /LPF — SIGNIFICANT CHANGE UP (ref 0–4)
CHLORIDE SERPL-SCNC: 105 MMOL/L — SIGNIFICANT CHANGE UP (ref 96–108)
CO2 SERPL-SCNC: 20 MMOL/L — LOW (ref 22–31)
COLOR SPEC: YELLOW — SIGNIFICANT CHANGE UP
CREAT SERPL-MCNC: 0.58 MG/DL — SIGNIFICANT CHANGE UP (ref 0.5–1.3)
DIFF PNL FLD: NEGATIVE — SIGNIFICANT CHANGE UP
EGFR: 121 ML/MIN/1.73M2 — SIGNIFICANT CHANGE UP
EGFR: 121 ML/MIN/1.73M2 — SIGNIFICANT CHANGE UP
EOSINOPHIL # BLD AUTO: 0.14 K/UL — SIGNIFICANT CHANGE UP (ref 0–0.5)
EOSINOPHIL NFR BLD AUTO: 1.9 % — SIGNIFICANT CHANGE UP (ref 0–6)
GLUCOSE SERPL-MCNC: 97 MG/DL — SIGNIFICANT CHANGE UP (ref 70–99)
GLUCOSE UR QL: NEGATIVE MG/DL — SIGNIFICANT CHANGE UP
HCG SERPL-ACNC: 6045 MIU/ML — HIGH
HCT VFR BLD CALC: 37.3 % — SIGNIFICANT CHANGE UP (ref 34.5–45)
HGB BLD-MCNC: 11.6 G/DL — SIGNIFICANT CHANGE UP (ref 11.5–15.5)
IMM GRANULOCYTES # BLD AUTO: 0.03 K/UL — SIGNIFICANT CHANGE UP (ref 0–0.07)
IMM GRANULOCYTES NFR BLD AUTO: 0.4 % — SIGNIFICANT CHANGE UP (ref 0–0.9)
INR BLD: 1.16 RATIO — SIGNIFICANT CHANGE UP (ref 0.85–1.16)
KETONES UR QL: NEGATIVE MG/DL — SIGNIFICANT CHANGE UP
LEUKOCYTE ESTERASE UR-ACNC: ABNORMAL
LIDOCAIN IGE QN: 29 U/L — SIGNIFICANT CHANGE UP (ref 7–60)
LYMPHOCYTES # BLD AUTO: 2.2 K/UL — SIGNIFICANT CHANGE UP (ref 1–3.3)
LYMPHOCYTES NFR BLD AUTO: 29.2 % — SIGNIFICANT CHANGE UP (ref 13–44)
MCHC RBC-ENTMCNC: 26.4 PG — LOW (ref 27–34)
MCHC RBC-ENTMCNC: 31.1 G/DL — LOW (ref 32–36)
MCV RBC AUTO: 85 FL — SIGNIFICANT CHANGE UP (ref 80–100)
MONOCYTES # BLD AUTO: 0.55 K/UL — SIGNIFICANT CHANGE UP (ref 0–0.9)
MONOCYTES NFR BLD AUTO: 7.3 % — SIGNIFICANT CHANGE UP (ref 2–14)
NEUTROPHILS # BLD AUTO: 4.55 K/UL — SIGNIFICANT CHANGE UP (ref 1.8–7.4)
NEUTROPHILS NFR BLD AUTO: 60.4 % — SIGNIFICANT CHANGE UP (ref 43–77)
NITRITE UR-MCNC: NEGATIVE — SIGNIFICANT CHANGE UP
NRBC # BLD AUTO: 0 K/UL — SIGNIFICANT CHANGE UP (ref 0–0)
NRBC # FLD: 0 K/UL — SIGNIFICANT CHANGE UP (ref 0–0)
NRBC BLD AUTO-RTO: 0 /100 WBCS — SIGNIFICANT CHANGE UP (ref 0–0)
PH UR: 5.5 — SIGNIFICANT CHANGE UP (ref 5–8)
PLATELET # BLD AUTO: 422 K/UL — HIGH (ref 150–400)
PMV BLD: 9.6 FL — SIGNIFICANT CHANGE UP (ref 7–13)
POTASSIUM SERPL-MCNC: 3.9 MMOL/L — SIGNIFICANT CHANGE UP (ref 3.5–5.3)
POTASSIUM SERPL-SCNC: 3.9 MMOL/L — SIGNIFICANT CHANGE UP (ref 3.5–5.3)
PROT SERPL-MCNC: 7 G/DL — SIGNIFICANT CHANGE UP (ref 6–8.3)
PROT UR-MCNC: NEGATIVE MG/DL — SIGNIFICANT CHANGE UP
PROTHROM AB SERPL-ACNC: 13.3 SEC — SIGNIFICANT CHANGE UP (ref 9.9–13.4)
RBC # BLD: 4.39 M/UL — SIGNIFICANT CHANGE UP (ref 3.8–5.2)
RBC # FLD: 13.8 % — SIGNIFICANT CHANGE UP (ref 10.3–14.5)
RBC CASTS # UR COMP ASSIST: 2 /HPF — SIGNIFICANT CHANGE UP (ref 0–4)
RH IG SCN BLD-IMP: NEGATIVE — SIGNIFICANT CHANGE UP
SODIUM SERPL-SCNC: 134 MMOL/L — LOW (ref 135–145)
SP GR SPEC: 1.02 — SIGNIFICANT CHANGE UP (ref 1–1.03)
SQUAMOUS # UR AUTO: 3 /HPF — SIGNIFICANT CHANGE UP (ref 0–5)
UROBILINOGEN FLD QL: 0.2 MG/DL — SIGNIFICANT CHANGE UP (ref 0.2–1)
WBC # BLD: 7.53 K/UL — SIGNIFICANT CHANGE UP (ref 3.8–10.5)
WBC # FLD AUTO: 7.53 K/UL — SIGNIFICANT CHANGE UP (ref 3.8–10.5)
WBC UR QL: 9 /HPF — HIGH (ref 0–5)

## 2025-06-25 PROCEDURE — 99284 EMERGENCY DEPT VISIT MOD MDM: CPT | Mod: 25

## 2025-06-25 PROCEDURE — 36415 COLL VENOUS BLD VENIPUNCTURE: CPT

## 2025-06-25 PROCEDURE — 96374 THER/PROPH/DIAG INJ IV PUSH: CPT

## 2025-06-25 PROCEDURE — 83690 ASSAY OF LIPASE: CPT

## 2025-06-25 PROCEDURE — 81001 URINALYSIS AUTO W/SCOPE: CPT

## 2025-06-25 PROCEDURE — 85025 COMPLETE CBC W/AUTO DIFF WBC: CPT

## 2025-06-25 PROCEDURE — 85610 PROTHROMBIN TIME: CPT

## 2025-06-25 PROCEDURE — 76817 TRANSVAGINAL US OBSTETRIC: CPT | Mod: 26

## 2025-06-25 PROCEDURE — 87086 URINE CULTURE/COLONY COUNT: CPT

## 2025-06-25 PROCEDURE — 86900 BLOOD TYPING SEROLOGIC ABO: CPT

## 2025-06-25 PROCEDURE — 86901 BLOOD TYPING SEROLOGIC RH(D): CPT

## 2025-06-25 PROCEDURE — 76817 TRANSVAGINAL US OBSTETRIC: CPT

## 2025-06-25 PROCEDURE — 85730 THROMBOPLASTIN TIME PARTIAL: CPT

## 2025-06-25 PROCEDURE — 99282 EMERGENCY DEPT VISIT SF MDM: CPT

## 2025-06-25 PROCEDURE — 99285 EMERGENCY DEPT VISIT HI MDM: CPT

## 2025-06-25 PROCEDURE — 86850 RBC ANTIBODY SCREEN: CPT

## 2025-06-25 PROCEDURE — 96375 TX/PRO/DX INJ NEW DRUG ADDON: CPT

## 2025-06-25 PROCEDURE — 80053 COMPREHEN METABOLIC PANEL: CPT

## 2025-06-25 PROCEDURE — 84702 CHORIONIC GONADOTROPIN TEST: CPT

## 2025-06-25 RX ORDER — ACETAMINOPHEN 500 MG/5ML
1000 LIQUID (ML) ORAL ONCE
Refills: 0 | Status: COMPLETED | OUTPATIENT
Start: 2025-06-25 | End: 2025-06-25

## 2025-06-25 RX ORDER — CEFTRIAXONE 500 MG/1
1000 INJECTION, POWDER, FOR SOLUTION INTRAMUSCULAR; INTRAVENOUS ONCE
Refills: 0 | Status: COMPLETED | OUTPATIENT
Start: 2025-06-25 | End: 2025-06-25

## 2025-06-25 RX ADMIN — CEFTRIAXONE 100 MILLIGRAM(S): 500 INJECTION, POWDER, FOR SOLUTION INTRAMUSCULAR; INTRAVENOUS at 15:45

## 2025-06-25 RX ADMIN — Medication 1000 MILLILITER(S): at 12:18

## 2025-06-25 RX ADMIN — Medication 400 MILLIGRAM(S): at 12:19

## 2025-06-25 NOTE — CONSULT NOTE ADULT - SUBJECTIVE AND OBJECTIVE BOX
35yo  @  5w3d  LMP 2025   presents to ER c/o having small amount of pink spotting yesterday after intercourse.  Pt denies any further bleding other then small amount of brown staining this am.  Pt states yesterday she was having left lower quadrant cramping which has since resolved.  Denies nausea or vomiting, diarrhea or constipation.  Denies fever or chills.  Pt tolerating regular diet.  Pt denies urinary complaints.  Pt states she feels fine now and denies complaints but her physician instructed her to come to the nearest hospital for evaluation      OBGYN Physician ; Dr Lopez   [phone]      All: muscle relaxants cause syncope, hallucinations (Other)  No Known Drug Allergies  Meds:   PMHx: Denies  PSHx: Spinal surgery, lumbar fusion L5-S-1, C/S x 2  Socialhx: Denies x 3, Denies anxiety or depression  OBhx:   FT  C/S  7lbs       FT  C/S  7lbs  2019  1st tri [facility] --> no D & C  GYNhx: h/o chlamydia, h/o HPV.  Denies fibroids, ov cysts     T(C): 36.8 (25 @ 19:30), Max: 37 (25 @ 16:00)  HR: 62 (25 @ 19:30) (62 - 84)  BP: 109/62 (25 @ 19:30) (109/62 - 117/84)  RR: 17 (25 @ 19:30) (17 - 19)  SpO2: 99% (25 @ 19:30) (98% - 100%)    Gen: NAD  Abdomen: +BS soft NT, no CVAT  Ext: no calf tenderness  No bleeding seen on external visualization of vagina, no blood seen on pad     Labs:                       11.6   7.53  )-----------( 422      ( 2025 12:15 )             37.3         134[L]  |  105  |  9   ----------------------------<  97  3.9   |  20[L]  |  0.58    Ca    9.3      2025 12:15    TPro  7.0  /  Alb  4.0  /  TBili  0.2  /  DBili  x   /  AST  14  /  ALT  16  /  AlkPhos  74  06-25      Urinalysis Basic - ( 2025 13:42 )    Color: Yellow / Appearance: Clear / S.021 / pH: x  Gluc: x / Ketone: x  / Bili: Negative / Urobili: 0.2 mg/dL   Blood: x / Protein: Negative mg/dL / Nitrite: Negative   Leuk Esterase: Trace / RBC: 2 /HPF / WBC 9 /HPF   Sq Epi: x / Non Sq Epi: 3 /HPF / Bacteria: Negative /HPF    Pelvic US:  Gestational sac 0.9cm  with implantation in C/S  + YS                35yo  @  5w3d  LMP 2025   presents to ER c/o having small amount of pink spotting yesterday after intercourse.  Pt denies any further bleeding other then small amount of brown staining this am.  Pt states yesterday she was having left lower quadrant cramping which has since resolved.  Denies nausea or vomiting, diarrhea or constipation.  Denies fever or chills.  Pt tolerating regular diet.  Pt denies urinary complaints.  Pt states she feels fine now and denies complaints but her physician instructed her to come to the nearest hospital for evaluation.      OBGYN Physician ; Dr Lopez   [phone]      All: muscle relaxants cause syncope, hallucinations (Other)  No Known Drug Allergies  Meds: none  PMHx: Denies  PSHx: Spinal surgery, lumbar fusion L5-S-1, C/S x 2  Socialhx: Denies x 3, Denies anxiety or depression  OBhx:   FT  C/S  7lbs       FT  C/S  7lbs  2019 tri [facility] --> no D & C  GYNhx: h/o chlamydia, h/o HPV.  Denies fibroids, ov cysts     T(C): 36.8 (25 @ 19:30), Max: 37 (25 @ 16:00)  HR: 62 (25 @ 19:30) (62 - 84)  BP: 109/62 (25 @ 19:30) (109/62 - 117/84)  RR: 17 (25 @ 19:30) (17 - 19)  SpO2: 99% (25 @ 19:30) (98% - 100%)    Gen: NAD  Abdomen: +BS soft NT, no CVAT  Ext: no calf tenderness  No bleeding seen on external inspection of vagina, no blood seen on pad ( per patient no bleeding today other then a small amount of brown spotting on pad)    Labs:                       11.6   7.53  )-----------( 422      ( 2025 12:15 )             37.3         134[L]  |  105  |  9   ----------------------------<  97  3.9   |  20[L]  |  0.58    Ca    9.3      2025 12:15    TPro  7.0  /  Alb  4.0  /  TBili  0.2  /  DBili  x   /  AST  14  /  ALT  16  /  AlkPhos  74  06-25      Urinalysis Basic - ( 2025 13:42 )    Color: Yellow / Appearance: Clear / S.021 / pH: x  Gluc: x / Ketone: x  / Bili: Negative / Urobili: 0.2 mg/dL   Blood: x / Protein: Negative mg/dL / Nitrite: Negative   Leuk Esterase: Trace / RBC: 2 /HPF / WBC 9 /HPF   Sq Epi: x / Non Sq Epi: 3 /HPF / Bacteria: Negative /HPF    Pelvic US:  Gestational sac 0.9cm  with implantation in C/S  + YS

## 2025-06-25 NOTE — ED ADULT NURSE NOTE - OBJECTIVE STATEMENT
Pt is a 34y F LMP 25  presenting to ED for L sided pelvic pain, vaginal spotting x 1 day. Pt states she had positive HCG last Friday. Pt endorses intermittent L sided pelvic pain. Pt denies fever, chills, cough, sob, cp. Pt endorses urinary frequency x 2 days. Pt is A&Ox4, breathing unlabored and spontaneous, moves all extremities. Pt resting in stretcher, bed in lowest position, aware of plan of care. Comfort and safety measures maintained.

## 2025-06-25 NOTE — CONSULT NOTE ADULT - ATTENDING COMMENTS
P2 @ 5 wks by LMP and sono  found to have early C/S scare ectopic  noted pink spot  ofter sex yesterday      pt is stable.   PLan as above, MTX scare inj for optimal treatment of condition  discussed w pt risk od continue preg--hemorrhage/maternal morbidities and mortality, uterine loss, placenta accreta, uterine defect  for MFM consult for treatment    Team attempted to reach pt's GYN.     Info for f/u give--to call tomorrow        NEREYDA Bassett MD

## 2025-06-25 NOTE — CONSULT NOTE ADULT - ASSESSMENT
A/P 35yo   @  5w 3d with c/s scar pregnancy, VSS, asymptomatic  - A negative - rhogam ordered by ED  - Pt to f/u with primary OBGYN and to f/u with High risk OB Dr Dorian Alarcon at [address] for c/s scar pregnancy managment  - Bleeding precuations given to patient    DW Dr Amato and Dr eSrjio Sultana PA-C A/P 33yo   @  5w 3d with c/s scar pregnancy, VSS, asymptomatic  - A negative - rhogam ordered by ED  - Pt to f/u with primary OBGYN and to f/u with High risk OB Dr Dorian Alarcon at [address] [place] [address] for c/s scar pregnancy managment tomorrow.  Pt given information to follow up and bleeding and pain precautions discussed with patient  - Pt seen at bedside with Dr Bassett and cleared for discharge from ED    KATE Amato and Dr Serjio Sultana PA-C

## 2025-06-25 NOTE — ED PROVIDER NOTE - PROGRESS NOTE DETAILS
Chantale Sandoval PA-C: received call from radiology regarding concern for ectopic pregnancy. spoke with obgyn. recommend calling back when imaging results. discussed with ED attending. I, Karissa De La Vega MD have assumed care of this patient. I have fully participated in the care of this patient. I have made amendments to the documentation where appropriate and have supervised the ongoing plan of care enacted by the Fellow/Resident/ACP/Student.  Patient was signed out to me.  The patient was evaluated by me  At this time patient is comfortable does not have any pain had very minimal amount of bleeding last night no bleeding at present..   patient was found to have an ectopic pregnancy.  Her GYN has been contacted. Chantale Sandoval PA-C: US results reviewed. spoke with gyn. will come see patient. Received signout.  Patient evaluated by OB and provided contact information for the high risk outpatient provider.  Also recommending RhoGAM which patient received.  Patient made aware of plan.  To be discharged.    Torres Curran PGY-3

## 2025-06-25 NOTE — ED PROVIDER NOTE - CLINICAL SUMMARY MEDICAL DECISION MAKING FREE TEXT BOX
Attending Ashley Myrick MD: 35 yo F  presents with left sided pelvic pain for one day with vaginal spotting. Reports a positive pregnancy test on friday at her GYN office, unexpected as per patient. Reports yesterday she was lifting heavy and developed left sided pain. She reports after she had intercourse with her  last night the pain got worse prompting her to let her GYN know, who advised ER evaluation     PE: well appearing, nontoxic, no respiratory distress.  Abdomen soft, left sided abdominal pain with palpation. Neuro nonfocal.  Skin intact. Psych normal mood.    MDM: Differential diagnosis includes but is not limited to ectopic pregnancy, pregnancy, UTI   Will assess with US and labs

## 2025-06-25 NOTE — ED PROVIDER NOTE - PATIENT PORTAL LINK FT
You can access the FollowMyHealth Patient Portal offered by [facility] by registering at the following website: [email or web address]. By joining "[employer]’s FollowMyHealth portal, you will also be able to view your health information using other applications (apps) compatible with our system.

## 2025-06-25 NOTE — ED PROVIDER NOTE - OBJECTIVE STATEMENT
35 y/o female, LMP 25, , presents to the ER for left sided pelvic pain. and vaginal spotting. patient states symptoms started a few days ago. states she saw obgyn to obtain ocps prior to vacation and was found to have positive hcg.  did not have pain at the time she saw the obgyn. states she had sexual intercourse with her  last night which intensified left sided pain. states also noticed very small amount of spotting this morning.  spoke to her obgyn. has appointment tomorrow but was advised to go to the ER today. denies f/n/v/d, CP, SOB, HA, dizziness, urinary symptoms, LOC.

## 2025-06-25 NOTE — ED PROVIDER NOTE - NSFOLLOWUPINSTRUCTIONS_ED_ALL_ED_FT
You were seen in the emergency department today and your lab results are attached.  You were given Rhogam while in the ED and evaluated by the OB team.     Please follow-up with Dr. Alarcon tomorrow.    Return to the emergency department for new or worsening symptoms.

## 2025-06-25 NOTE — ED ADULT NURSE REASSESSMENT NOTE - NS ED NURSE REASSESS COMMENT FT1
Report received from Jim CROWLEY. Pt A&Ox4, in no acute distress at time. Patient awaiting OBGYN consult. Patient safety maintained, bed is in lowest position, wheels locked, and side rails raised. Patient oriented to call bell, and call bell is within reach.

## 2025-06-25 NOTE — ED PROVIDER NOTE - CARE PROVIDER_API CALL
Louis Hartmann  Maternal and Fetal Medicine  [address] [place] [address], [address], [place] [address]  Phone: [phone]  Fax: [phone]  Follow Up Time:

## 2025-06-26 ENCOUNTER — APPOINTMENT (OUTPATIENT)
Dept: ANTEPARTUM | Facility: CLINIC | Age: 35
End: 2025-06-26

## 2025-06-26 ENCOUNTER — TRANSCRIPTION ENCOUNTER (OUTPATIENT)
Age: 35
End: 2025-06-26

## 2025-06-26 ENCOUNTER — INPATIENT (INPATIENT)
Facility: HOSPITAL | Age: 35
LOS: 0 days | Discharge: HOME | DRG: 833 | End: 2025-06-26
Attending: OBSTETRICS & GYNECOLOGY | Admitting: OBSTETRICS & GYNECOLOGY
Payer: COMMERCIAL

## 2025-06-26 VITALS
DIASTOLIC BLOOD PRESSURE: 84 MMHG | WEIGHT: 212.97 LBS | SYSTOLIC BLOOD PRESSURE: 151 MMHG | TEMPERATURE: 98 F | RESPIRATION RATE: 17 BRPM | OXYGEN SATURATION: 99 % | HEART RATE: 82 BPM | HEIGHT: 63 IN

## 2025-06-26 VITALS
DIASTOLIC BLOOD PRESSURE: 66 MMHG | SYSTOLIC BLOOD PRESSURE: 112 MMHG | OXYGEN SATURATION: 100 % | RESPIRATION RATE: 14 BRPM | TEMPERATURE: 98 F | HEART RATE: 80 BPM

## 2025-06-26 DIAGNOSIS — O00.90 UNSPECIFIED ECTOPIC PREGNANCY WITHOUT INTRAUTERINE PREGNANCY: ICD-10-CM

## 2025-06-26 DIAGNOSIS — Z98.1 ARTHRODESIS STATUS: Chronic | ICD-10-CM

## 2025-06-26 LAB
ALBUMIN SERPL ELPH-MCNC: 4.2 G/DL — SIGNIFICANT CHANGE UP (ref 3.3–5)
ALP SERPL-CCNC: 72 U/L — SIGNIFICANT CHANGE UP (ref 40–120)
ALT FLD-CCNC: 15 U/L — SIGNIFICANT CHANGE UP (ref 10–45)
ANION GAP SERPL CALC-SCNC: 13 MMOL/L — SIGNIFICANT CHANGE UP (ref 5–17)
APTT BLD: 33.5 SEC — SIGNIFICANT CHANGE UP (ref 26.1–36.8)
AST SERPL-CCNC: 12 U/L — SIGNIFICANT CHANGE UP (ref 10–40)
BASOPHILS # BLD AUTO: 0.07 K/UL — SIGNIFICANT CHANGE UP (ref 0–0.2)
BASOPHILS NFR BLD AUTO: 0.8 % — SIGNIFICANT CHANGE UP (ref 0–2)
BILIRUB SERPL-MCNC: 0.3 MG/DL — SIGNIFICANT CHANGE UP (ref 0.2–1.2)
BLD GP AB SCN SERPL QL: POSITIVE — SIGNIFICANT CHANGE UP
BUN SERPL-MCNC: 9 MG/DL — SIGNIFICANT CHANGE UP (ref 7–23)
CALCIUM SERPL-MCNC: 9.1 MG/DL — SIGNIFICANT CHANGE UP (ref 8.4–10.5)
CHLORIDE SERPL-SCNC: 104 MMOL/L — SIGNIFICANT CHANGE UP (ref 96–108)
CO2 SERPL-SCNC: 19 MMOL/L — LOW (ref 22–31)
CREAT SERPL-MCNC: 0.57 MG/DL — SIGNIFICANT CHANGE UP (ref 0.5–1.3)
EGFR: 121 ML/MIN/1.73M2 — SIGNIFICANT CHANGE UP
EGFR: 121 ML/MIN/1.73M2 — SIGNIFICANT CHANGE UP
EOSINOPHIL # BLD AUTO: 0.12 K/UL — SIGNIFICANT CHANGE UP (ref 0–0.5)
EOSINOPHIL NFR BLD AUTO: 1.4 % — SIGNIFICANT CHANGE UP (ref 0–6)
GLUCOSE SERPL-MCNC: 94 MG/DL — SIGNIFICANT CHANGE UP (ref 70–99)
HCG SERPL-ACNC: 6978 MIU/ML — HIGH
HCT VFR BLD CALC: 38.2 % — SIGNIFICANT CHANGE UP (ref 34.5–45)
HGB BLD-MCNC: 12.1 G/DL — SIGNIFICANT CHANGE UP (ref 11.5–15.5)
IMM GRANULOCYTES # BLD AUTO: 0.03 K/UL — SIGNIFICANT CHANGE UP (ref 0–0.07)
IMM GRANULOCYTES NFR BLD AUTO: 0.3 % — SIGNIFICANT CHANGE UP (ref 0–0.9)
INR BLD: 1.16 RATIO — SIGNIFICANT CHANGE UP (ref 0.85–1.16)
LYMPHOCYTES # BLD AUTO: 2.15 K/UL — SIGNIFICANT CHANGE UP (ref 1–3.3)
LYMPHOCYTES NFR BLD AUTO: 24.7 % — SIGNIFICANT CHANGE UP (ref 13–44)
MCHC RBC-ENTMCNC: 26.9 PG — LOW (ref 27–34)
MCHC RBC-ENTMCNC: 31.7 G/DL — LOW (ref 32–36)
MCV RBC AUTO: 84.9 FL — SIGNIFICANT CHANGE UP (ref 80–100)
MONOCYTES # BLD AUTO: 0.5 K/UL — SIGNIFICANT CHANGE UP (ref 0–0.9)
MONOCYTES NFR BLD AUTO: 5.8 % — SIGNIFICANT CHANGE UP (ref 2–14)
NEUTROPHILS # BLD AUTO: 5.82 K/UL — SIGNIFICANT CHANGE UP (ref 1.8–7.4)
NEUTROPHILS NFR BLD AUTO: 67 % — SIGNIFICANT CHANGE UP (ref 43–77)
NRBC # BLD AUTO: 0 K/UL — SIGNIFICANT CHANGE UP (ref 0–0)
NRBC # FLD: 0 K/UL — SIGNIFICANT CHANGE UP (ref 0–0)
NRBC BLD AUTO-RTO: 0 /100 WBCS — SIGNIFICANT CHANGE UP (ref 0–0)
PLATELET # BLD AUTO: 424 K/UL — HIGH (ref 150–400)
PMV BLD: 9.5 FL — SIGNIFICANT CHANGE UP (ref 7–13)
POTASSIUM SERPL-MCNC: 4 MMOL/L — SIGNIFICANT CHANGE UP (ref 3.5–5.3)
POTASSIUM SERPL-SCNC: 4 MMOL/L — SIGNIFICANT CHANGE UP (ref 3.5–5.3)
PROT SERPL-MCNC: 7.2 G/DL — SIGNIFICANT CHANGE UP (ref 6–8.3)
PROTHROM AB SERPL-ACNC: 13.2 SEC — SIGNIFICANT CHANGE UP (ref 9.9–13.4)
RBC # BLD: 4.5 M/UL — SIGNIFICANT CHANGE UP (ref 3.8–5.2)
RBC # FLD: 13.7 % — SIGNIFICANT CHANGE UP (ref 10.3–14.5)
RH IG SCN BLD-IMP: NEGATIVE — SIGNIFICANT CHANGE UP
SODIUM SERPL-SCNC: 136 MMOL/L — SIGNIFICANT CHANGE UP (ref 135–145)
WBC # BLD: 8.69 K/UL — SIGNIFICANT CHANGE UP (ref 3.8–10.5)
WBC # FLD AUTO: 8.69 K/UL — SIGNIFICANT CHANGE UP (ref 3.8–10.5)

## 2025-06-26 PROCEDURE — 85730 THROMBOPLASTIN TIME PARTIAL: CPT

## 2025-06-26 PROCEDURE — 86077 PHYS BLOOD BANK SERV XMATCH: CPT

## 2025-06-26 PROCEDURE — 99285 EMERGENCY DEPT VISIT HI MDM: CPT

## 2025-06-26 PROCEDURE — 86870 RBC ANTIBODY IDENTIFICATION: CPT

## 2025-06-26 PROCEDURE — 76801 OB US < 14 WKS SINGLE FETUS: CPT

## 2025-06-26 PROCEDURE — 84702 CHORIONIC GONADOTROPIN TEST: CPT

## 2025-06-26 PROCEDURE — 86900 BLOOD TYPING SEROLOGIC ABO: CPT

## 2025-06-26 PROCEDURE — 80053 COMPREHEN METABOLIC PANEL: CPT

## 2025-06-26 PROCEDURE — C9399: CPT

## 2025-06-26 PROCEDURE — 88305 TISSUE EXAM BY PATHOLOGIST: CPT | Mod: 26

## 2025-06-26 PROCEDURE — 86850 RBC ANTIBODY SCREEN: CPT

## 2025-06-26 PROCEDURE — 99205 OFFICE O/P NEW HI 60 MIN: CPT | Mod: 25

## 2025-06-26 PROCEDURE — 76817 TRANSVAGINAL US OBSTETRIC: CPT

## 2025-06-26 PROCEDURE — 85025 COMPLETE CBC W/AUTO DIFF WBC: CPT

## 2025-06-26 PROCEDURE — 58120 DILATION AND CURETTAGE: CPT

## 2025-06-26 PROCEDURE — 85610 PROTHROMBIN TIME: CPT

## 2025-06-26 PROCEDURE — 88305 TISSUE EXAM BY PATHOLOGIST: CPT

## 2025-06-26 PROCEDURE — 86901 BLOOD TYPING SEROLOGIC RH(D): CPT

## 2025-06-26 RX ORDER — HYDROMORPHONE/SOD CHLOR,ISO/PF 2 MG/10 ML
0.5 SYRINGE (ML) INJECTION
Refills: 0 | Status: DISCONTINUED | OUTPATIENT
Start: 2025-06-26 | End: 2025-06-26

## 2025-06-26 RX ORDER — ACETAMINOPHEN 500 MG/5ML
1000 LIQUID (ML) ORAL ONCE
Refills: 0 | Status: COMPLETED | OUTPATIENT
Start: 2025-06-26 | End: 2025-06-26

## 2025-06-26 RX ORDER — ONDANSETRON HCL/PF 4 MG/2 ML
4 VIAL (ML) INJECTION ONCE
Refills: 0 | Status: DISCONTINUED | OUTPATIENT
Start: 2025-06-26 | End: 2025-06-26

## 2025-06-26 RX ORDER — HYDROMORPHONE/SOD CHLOR,ISO/PF 2 MG/10 ML
1 SYRINGE (ML) INJECTION
Refills: 0 | Status: DISCONTINUED | OUTPATIENT
Start: 2025-06-26 | End: 2025-06-26

## 2025-06-26 RX ADMIN — Medication 1000 MILLILITER(S): at 14:45

## 2025-06-26 RX ADMIN — Medication 400 MILLIGRAM(S): at 14:45

## 2025-06-26 NOTE — BRIEF OPERATIVE NOTE - OPERATION/FINDINGS
EUA: Grossly normal external genitalia, cervix, vagina. Bimanual exam revealed anteverted uterus approximately 8-10 week size.   D&C:  scar ectopic visualized on sono, thin endometrial stripe noted following removal of products of conception. No color flow visualized.

## 2025-06-26 NOTE — H&P ADULT - ASSESSMENT
35yo  at 5w4d GA by LMP presenting from [facility] office with  scar ectopic pregnancy, here for surgical management.     - Patient to be admitted for dilation and suction curettage under ultrasound guidance  - NPO@***  - Draw CBC, T+S  - 2u PRBC on hold  - Start IVF, LR@125cc/hr  - DO NOT REPEAT TRANSVAGINAL ULTRASOUND WHILE IN EMERGENCY ROOM    Preliminary note, plan not finalized until discussed with attending  NEREYDA Hayward PGY1   35yo  at 5w4d GA by LMP presenting from [facility] office with  scar ectopic pregnancy, here for surgical management.     - Patient to be admitted for dilation and suction curettage under ultrasound guidance  - NPO@***  - Draw CBC, T+S  - 2u PRBC on hold  - Start IVF, LR@125cc/hr  - DO NOT REPEAT TRANSVAGINAL ULTRASOUND WHILE IN EMERGENCY ROOM    Preliminary note, plan not finalized until discussed with attending  A Yesy PGY1    **INCOMPLETE NOTE**   35yo  at 5w4d GA by LMP presenting from [facility] office with  scar ectopic pregnancy, here for surgical management.     - Patient to be admitted for dilation and suction curettage under ultrasound guidance, consents signed  - NPO since 11pm  - Draw CBC, T+S  - 2u PRBC on hold  - Start IVF, LR@125cc/hr  - DO NOT REPEAT TRANSVAGINAL ULTRASOUND WHILE IN EMERGENCY ROOM  - Anticipate ASU discharge    d/w Dr. Lima Hayward PGY1

## 2025-06-26 NOTE — ASU DISCHARGE PLAN (ADULT/PEDIATRIC) - FINANCIAL ASSISTANCE
[facility] provides services at a reduced cost to those who are determined to be eligible through [facility]’s financial assistance program. Information regarding [facility]’s financial assistance program can be found by going to [email or web address] or by calling [phone].

## 2025-06-26 NOTE — ED ADULT NURSE NOTE - OBJECTIVE STATEMENT
34 year old female G402, 5 weeks 3 days pregnant, presents to the ED sent in from outpatient obgyn due to findings from yesterday's TVUS/US at [facility] ed. Pt. presented for vaginal bleeding yesterday, TVUS showed ectopic pregnancy in c section scar and was told to follow up for medical  however at  the office today the obgyn team thought it better for her to come back to hospital for a D&C. patient has been NPO since 11pm last night . VSS. Pt. states bleeding has stopped today. Endorsing left sided abd pain.   20g peripheral IV placed in L AC and labs including pre-ops sent to lab per MD orders.   ObGYN team at bedside in gold 2 to discuss/ consent patient for surgery.  Pt. has requested to wait for her spouse to arrive to hospital before she goes into surgery.

## 2025-06-26 NOTE — ED PROVIDER NOTE - CLINICAL SUMMARY MEDICAL DECISION MAKING FREE TEXT BOX
Attending MD Tello:   Subjective:    - Chief Complaint (CC): Ectopic pregnancy requiring surgical management    - History of Present Illness: The patient is a female who presents to the Emergency Department for management of an ectopic pregnancy. She was initially discharged from the hospital last night with instructions to receive methotrexate injection at an outpatient office. This morning, she went to the [facility] office where she was scanned and informed that she needs a D&C (Dilation and Curettage). The patient reports confusion and frustration about the change in management plan. She states that the pregnancy is not in the fallopian tube but in the 'C tube' (likely referring to the cervix). The patient reports a little bit of pain on her left side. She had some spotting on Wednesday, which led to an ER visit where the ectopic pregnancy was discovered. The bleeding had stopped by yesterday morning, and she denies any bleeding since then. The patient expresses anxiety about the procedure, particularly concerned about potential damage to her uterus during the D&C.    - History: The patient reports this is her fourth pregnancy. She has two living children, both delivered by . Her third pregnancy ended in miscarriage. This current pregnancy was unexpected, occurring after approximately 10 years since her last pregnancy. 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 She occasionally takes ibuprofen for pain. The patient was prescribed gabapentin and Cymbalta but never took them due to the pregnancy discovery. She denies any history of diabetes, hypertension, or hypercholesterolemia. The patient reports allergies to muscle relaxants including cyclobenzaprine, Robaxin (methocarbamol), and Flexeril, which have caused hallucinations and fainting in the past. Her last menstrual period was on May 18th. The patient and her  had been trying to conceive for two years before giving up, and this pregnancy occurred unexpectedly.     Review of Systems:    -  Genitourinary: Reports left-sided pelvic pain. Denies current vaginal bleeding.    -  Neurological: Reports right-sided weakness related to previous spine surgery.    -  Psychiatric: Reports anxiety, particularly about the upcoming procedure.    -  Musculoskeletal: Reports right leg weakness due to previous spine surgery complications.     Objective:   Lungs Clear  Heart RRR  Abdomen soft non distended with mild LUQ tenderness, no rebound or guarding.       - Musculoskeletal: Right-sided weakness observed, consistent with patient's reported history.     Differential Diagnosis:    - Ectopic pregnancy, Urinary tract infection     Assessment and Plan:    - Problem 1: Ectopic Pregnancy    - Assessment/Plan: The patient presents with a confirmed ectopic pregnancy, initially planned for medical management with methotrexate. However, after further evaluation at an outpatient office this morning, the plan has changed to surgical management with D&C.  Ectopic is at prior  scare.       - Consult Gynecology service for further evaluation and management       - Perform necessary pre-operative laboratory tests       - Administer pain medication as needed (Acetaminophen 1 gram IV ordered)       - Await Gynecology team for detailed explanation of the procedure and timing       - Discuss with Gynecology attending about the change in management plan and reasons for it       - Provide emotional support and address patient's anxiety about the procedure       - Encourage patient to have a family member on speaker phone during Gynecology consultation for additional support     - Problem 2: Anxiety    - Assessment/Plan: The patient reports significant anxiety about the procedure and the rapid changes in her management plan.      - Provide reassurance and clear communication about the management plan       - Consider anxiolysis if needed, taking into account the planned procedure       - Encourage presence of family support, either in person or via phone     - Problem 3: Urinary Tract Infection    - Assessment/Plan: The patient reports being diagnosed with a UTI yesterday and started on antibiotics.      - Confirm antibiotic prescription and ensure appropriate treatment       - Monitor for any signs of worsening infection

## 2025-06-26 NOTE — ASU DISCHARGE PLAN (ADULT/PEDIATRIC) - NS MD DC FALL RISK RISK
For information on Fall & Injury Prevention, visit: [email or web address] OR  [email or web address] OR  https://www.cdc.gov/steadi/patient.html

## 2025-06-26 NOTE — ED ADULT NURSE REASSESSMENT NOTE - NS ED NURSE REASSESS COMMENT FT1
patient brought to OR while on phone with spouse who was pulling into parking lot of hospital and meeting patient and taking her cellphone and small purse. OR RN maritza aware spouse was coming for valuables. 1 bag of clothing secured in OR cabinet in purple area of ED.

## 2025-06-26 NOTE — H&P ADULT - NSHPLABSRESULTS_GEN_ALL_CORE
LABS:                              11.6   7.53  )-----------( 422      ( 2025 12:15 )             37.3         134[L]  |  105  |  9   ----------------------------<  97  3.9   |  20[L]  |  0.58    Ca    9.3      2025 12:15    TPro  7.0  /  Alb  4.0  /  TBili  0.2  /  DBili  x   /  AST  14  /  ALT  16  /  AlkPhos  74      I&O's Detail    PT/INR - ( 2025 12:15 )   PT: 13.3 sec;   INR: 1.16 ratio         PTT - ( 2025 12:15 )  PTT:33.8 sec  Urinalysis Basic - ( 2025 13:42 )    Color: Yellow / Appearance: Clear / S.021 / pH: x  Gluc: x / Ketone: x  / Bili: Negative / Urobili: 0.2 mg/dL   Blood: x / Protein: Negative mg/dL / Nitrite: Negative   Leuk Esterase: Trace / RBC: 2 /HPF / WBC 9 /HPF   Sq Epi: x / Non Sq Epi: 3 /HPF / Bacteria: Negative /HPF        RADIOLOGY & ADDITIONAL STUDIES:  < from: US Transvaginal, OB (25 @ 15:34) >    ACC: [id number] EXAM:  US OB TRANSVAGINAL   ORDERED BY: ALIN DAY     PROCEDURE DATE:  2025          INTERPRETATION:  CLINICAL INFORMATION: Vaginal bleeding. History of    x2. Beta hCG 6045.    LMP: 2025    Estimated Gestational Age by LMP: 5 weeks 3 days    COMPARISON: None available.    TECHNIQUE: Endovaginal and transabdominal pelvic sonogram. Color and   Spectral Doppler was performed.    FINDINGS:  Uterus: 10.8 x 6.0 x 7.2 cm. Low lying  scar with expected   thinning of the anterior myometrium. Fundal fibroid measures 2.4 x 2.1 x   2.4 cm. Trace fluid in the endocervical canal.  Endometrium: Thickened and heterogeneous at the level of the fundus   containing endometrial cavity fluid. At the level of the  scar,   the endometrium measures 2 to 3 mm.    Gestational Sac Size (mean): 0.9 cm. Implanted within the  scar.   Spectral Doppler demonstrates increased vascularity anteriorly with   prominent diastolic flow, characteristic of trophoblastic tissue. Two   tiny adjacent subchorionic hematomas are noted.  Gestational Sac Shape : Normal.  Yolk Sac: Normal  No fetal pole.    Right ovary: 1.6 cm x 1.7 cm x 1.8 cm. Within normal limits.  Left ovary: 4.1 cm x 2.4 cm x 3.4 cm. 2 small corpus luteal cysts   inseparable from the ovary measuring 1.8 x 2.0 x 1.6 cm and 1.6 x 1.4 x   1.2 cm.    Fluid: Trace to small volume free fluid in posterior cul-de-sac and   bilateral adnexal regions.    IMPRESSION:  Gestational sac with YS, no FP with adjacent tiny subchorionic hematomas   implanted within the  scar, consistent with  section   scar ectopic pregnancy.    Findings were discussed with MIKY Day 2025 3:37 PM by Dr. Tyesha Senior with read back confirmation.    --- End of Report ---          TYESHA SENIOR MD; Resident Radiologist  This document has been electronically signed.  FAM BOYLE MD; Attending Radiologist  This document has been electronically signed. 2025  5:29PM    < end of copied text >

## 2025-06-26 NOTE — H&P ADULT - HISTORY OF PRESENT ILLNESS
ABBEY AGUILERA  34y  Female [id number]    HPI: 33yo  at 5w4d LMP 25 sent in from [facility] office for management of  section ectopic pregnancy. Patient initially presented to ED last night with vaginal spotting after intercourse. TVUS performed at that time notable for Gestational sac 0.9cm  with implantation in C/S  + YS. Patient currently endorses ***. Last ate at ***.       OBGYN Physician ; Dr Lopez   [phone]    OBhx:   FT  C/S  7lbs       FT  C/S  7lbs  2019 tri SAB --> no D & C  GYNhx: h/o chlamydia, h/o HPV.  Denies fibroids, ov cysts   All: muscle relaxants cause syncope, hallucinations (Other)  Meds: none  PMHx: Denies  PSHx: Spinal surgery, lumbar fusion L5-S-1, C/S x 2  Socialhx: Denies x 3, Denies anxiety or depression     **INCOMPLETE NOTE**    ABBEY AGUILERA  34y  Female [id number]    HPI: 35yo  at 5w4d LMP 25 sent in from [facility] office for management of  section ectopic pregnancy. Patient initially presented to ED last night with vaginal spotting after intercourse. TVUS performed at that time notable for Gestational sac 0.9cm  with implantation in C/S  + YS. Patient currently endorses ***. Last ate at ***.       OBGYN Physician ; Dr Lopez   [phone]    OBhx:   FT  C/S  7lbs       FT  C/S  7lbs  2019 tri SAB --> no D & C  GYNhx: h/o chlamydia, h/o HPV.  Denies fibroids, ov cysts   All: muscle relaxants cause syncope, hallucinations (Other)  Meds: none  PMHx: Denies  PSHx: Spinal surgery, lumbar fusion L5-S-1, C/S x 2  Socialhx: Denies x 3, Denies anxiety or depression     ABBEY AGUILERA  34y  Female [id number]    HPI: 33yo  at 5w4d LMP 25 sent in from [facility] office for management of  section ectopic pregnancy. Patient initially presented to ED last night with vaginal spotting after intercourse. TVUS performed at that time notable for Gestational sac 0.9cm  with implantation in C/S  + YS. Patient currently asymptomatic. Surgery being added on for today due to high risk of rupture, patient is planning on traveling out of the country for one month starting next week, patient not a candidate for medical therapy with methotrexate. Last ate at 11pm.       OBGYN Physician ; Dr Lopez   [phone]    OBhx:   FT  C/S  7lbs       FT  C/S  7lbs  2019 tri SAB --> no D & C  GYNhx: h/o chlamydia, h/o HPV.  Denies fibroids, ov cysts   All: muscle relaxants cause syncope, hallucinations (Other)  Meds: none  PMHx: Denies  PSHx: Spinal surgery, lumbar fusion L5-S-1, C/S x 2 with 4 nails and two rods  Socialhx: Denies x 3, Denies anxiety or depression

## 2025-06-26 NOTE — ED ADULT TRIAGE NOTE - CHIEF COMPLAINT QUOTE
Pt reports going to outpatient OB for tx of c/s scar ectopic pregnancy (pt 5 wks pregnant), told to come to ED for preop and a d&c.  Pt endorses L sided cramps, denies vaginal bleeding.

## 2025-06-26 NOTE — ASU DISCHARGE PLAN (ADULT/PEDIATRIC) - ASU DC SPECIAL INSTRUCTIONSFT
After your surgery it is normal to experience:  •	Vaginal bleeding- can last 1-2 weeks and should not be heavier than a period. It may come and go and be red, brown or pink. Use pads, not tampons.  •	Cramping- Is common especially within the first 24 hours. This should be relieved by taking over the counter motrin, advil or Tylenol.  •	Watery discharge- can be seen for a day or two after hysteroscopy because some of the fluid that is put into the uterus during surgery may continue to leak out for a day or two.  •	Vaginal soreness/irritation- can occur in the first few days after surgery because of the instruments that were used in the vagina. Soreness can be treated with ice pack and irritation can be taken care of with an emollient such as balmex or aquaphor that you can put directly on the irritated area.    Restrictions: For 10-14 days after the surgery you should avoid the following:  •	Tampons  •	Sex  •	Vigorous gym exercise  •	Swimming  pools and tub baths  •	Wait a day or two before going back to work    Anesthesia Precautions:  For the next 12 hours do not:   •	drive a car,  •	operate power tools or machinery,  •	drink alcohol, beer, or wine,   •	make important personal or business decisions    Diet:   •	Resume Regular diet but Progress diet slowly     Pain Medication:  •	Take over the counter pain medication  •	Tylenol 975 mg every 6 hours, and Motrin 800 mg every 6 hours, alternating (Tylenol, three hours later Motrin, three hours later Tylenol)    Physician Notification- Warning signs to look out for  •	Heavy Vaginal Bleeding   •	Shortness of breath or chest pain  •	Severe Abdominal Pain  •	Persistent nausea and vomiting  •	Pain not relieved by medications  •	Fever greater than 100.4®F  •	Inability to tolerate liquids or foods  •	Unable to urinate after 8 hours

## 2025-06-26 NOTE — ED PROVIDER NOTE - ATTENDING CONTRIBUTION TO CARE
Attending MD Tello:   I personally have seen and examined this patient. I personally made/approved the management plan and take responsibility for the patient management.  Resident note reviewed and agree on plan of care and except where noted.  Please see my MDM for further details and patient specific information.

## 2025-06-26 NOTE — ED PROVIDER NOTE - PROGRESS NOTE DETAILS
Ramesh ARAGON), PGY-2: Spoke with GYN, they are expecting patient for emergent add-on case in the OR for ectopic, they request repeat labs, 1 L IV fluid for hydration, patient be admitted to Dr. Flores's service.

## 2025-06-26 NOTE — BRIEF OPERATIVE NOTE - NSICDXBRIEFPROCEDURE_GEN_ALL_CORE_FT
PROCEDURES:  Dilation and curettage of uterus using suction with ultrasound guidance 26-Jun-2025 17:55:47  Clau Hayward  Exam under anesthesia, gynecologic 26-Jun-2025 17:55:54  Clau Hayward

## 2025-06-26 NOTE — CHART NOTE - NSCHARTNOTEFT_GEN_A_CORE
R1 OBGYN POST-OP CHECK    S: Patient seen and evaluated at bedside.  Pt awake and alert resting comfortably in bed.  Patient reports pain controlled with analgesia. Pt denies N/V, SOB, CP, palpitations, fever/chills. Tolerating clears and solids.  Has been OOB.    O:   T(C): 36.5 (06-26-25 @ 19:00), Max: 36.9 (06-26-25 @ 15:57)  HR: 82 (06-26-25 @ 19:00) (63 - 84)  BP: 121/59 (06-26-25 @ 19:00) (98/50 - 151/84)  RR: 18 (06-26-25 @ 19:00) (16 - 18)  SpO2: 100% (06-26-25 @ 19:00) (99% - 100%)  Wt(kg): --  I&O's Summary    26 Jun 2025 07:01  -  26 Jun 2025 19:50  --------------------------------------------------------  IN: 0 mL / OUT: 250 mL / NET: -250 mL        Gen: Resting comfortably in bed, NAD  CV: S1S2, RRR  Lungs: CTA B/L  Abd: soft, minimally tender, nondistended.  Ext: SCD's in place and functional, non-tender b/l, no edema        A/P: 34y Female s/p suction D&C under US guidance for C/S scar ectopic. EBL 20. Patient is recovering well postoperatively. VSS, AF.    Neuro: PO Analgesia PRN   CV: Hemodynamically stable.  Monitor VS.  Pulm: Saturating well on room air.  Encourage OOB and incentive spirometer use.   GI: Advance to regular diet. Anti-emetics PRN.  : voiding spontaneously  FEN: SLIV  Heme: DVT ppx w/ SCD's while in bed. Early ambulation, initially with assistance then as tolerated.  ID: Afebrile  Endo: No active issues   Dispo: Discharge from PACU when criteria met.     Kimberlyn Gonzalez, PGY-1

## 2025-06-26 NOTE — H&P ADULT - ATTENDING COMMENTS
Agree w/A&P as above. Briefly, pt is a 35yo  at 5w4d GA by LMP with  scar ectopic diagnosed in ER and confirmed on f/u sono w/MFM today. She presents from Dr. Dorian Alarcon's office for surgical management after counseling on options. Options again reviewed and given pt's upcoming travel limiting follow up, medical management is not an option. Pt is s/p rhogam and is in agreement with surgical plan. Will proceed with EUA, dilation and suction curettage under ultrasound guidance, and possible laparoscopy with removal of  scar ectopic tissue. Pt declines contraception at this time.  D/w Dr. Deb Amato MD  [facility] GYN Service

## 2025-06-26 NOTE — ED ADULT NURSE NOTE - IN THE PAST 12 MONTHS HAVE YOU USED DRUGS OTHER THAN THOSE REQUIRED FOR MEDICAL REASON?
[address] [place] [address] Eleonora Vogt [address] [address]  Phone: [phone]  Fax: [phone]    Miriam Chandra CNP        February 1, 2018     Patient: Perla House   YOB: 1999   Date of Visit: 2/1/2018       To Whom it May Concern:    Perla House was seen in my clinic on 2/1/2018. She may return to school on Mon, Tues or Wed next week, once Flu symptoms and ear infection are imrpoved. If you have any questions or concerns, please don't hesitate to call.     Sincerely,         Miriam Chandra CNP No

## 2025-06-26 NOTE — H&P ADULT - NSHPPHYSICALEXAM_GEN_ALL_CORE
Vital Signs Last 24 Hrs  T(C): 36.9 (26 Jun 2025 13:19), Max: 37 (25 Jun 2025 16:00)  T(F): 98.4 (26 Jun 2025 13:19), Max: 98.6 (25 Jun 2025 16:00)  HR: 82 (26 Jun 2025 13:19) (62 - 82)  BP: 151/84 (26 Jun 2025 13:19) (109/62 - 151/84)  BP(mean): 82 (25 Jun 2025 23:55) (78 - 82)  RR: 17 (26 Jun 2025 13:19) (17 - 19)  SpO2: 99% (26 Jun 2025 13:19) (99% - 100%)    Parameters below as of 26 Jun 2025 13:19  Patient On (Oxygen Delivery Method): room air        Physical Exam: ***  General: sitting comfortably in bed, NAD   CV: RR S1S2 no m/r/g  Lungs: CTA b/l, good air flow b/l   Back: No CVA tenderness  Abd: Soft, non-tender, non-distended.  Bowel sounds present.    :  No bleeding on pad.    External labia wnl.  Bimanual exam with no cervical motion tenderness, uterus wnl, adnexa non palpable b/l.  Cervix closed vs. Cervix dilated    cm   Speculum Exam: No active bleeding from os.  Physiologic discharge.    Ext: non-tender b/l, no edema Vital Signs Last 24 Hrs  T(C): 36.9 (26 Jun 2025 13:19), Max: 37 (25 Jun 2025 16:00)  T(F): 98.4 (26 Jun 2025 13:19), Max: 98.6 (25 Jun 2025 16:00)  HR: 82 (26 Jun 2025 13:19) (62 - 82)  BP: 151/84 (26 Jun 2025 13:19) (109/62 - 151/84)  BP(mean): 82 (25 Jun 2025 23:55) (78 - 82)  RR: 17 (26 Jun 2025 13:19) (17 - 19)  SpO2: 99% (26 Jun 2025 13:19) (99% - 100%)    Parameters below as of 26 Jun 2025 13:19  Patient On (Oxygen Delivery Method): room air        Physical Exam:   General: sitting comfortably in bed, NAD   CV: warm and well perfused  Lungs: breathing comfortably on room air  Abd: Soft, non-tender, non-distended.    :  Exam deferred  Ext: non-tender b/l, no edema

## 2025-06-26 NOTE — ASU DISCHARGE PLAN (ADULT/PEDIATRIC) - CARE PROVIDER_API CALL
Louis Hartmann  Maternal and Fetal Medicine  [address], [address]  [place] [address]  Phone: [phone]  Fax: [phone]  Follow Up Time: 1 week    [facility],   [address] [place] [id number]  Phone: [phone]  Fax: (   )    -  Follow Up Time: 1 week

## 2025-06-27 LAB
CULTURE RESULTS: SIGNIFICANT CHANGE UP
SPECIMEN SOURCE: SIGNIFICANT CHANGE UP

## 2025-07-01 ENCOUNTER — NON-APPOINTMENT (OUTPATIENT)
Age: 35
End: 2025-07-01

## 2025-07-01 ENCOUNTER — EMERGENCY (EMERGENCY)
Facility: HOSPITAL | Age: 35
LOS: 1 days | End: 2025-07-01
Admitting: EMERGENCY MEDICINE
Payer: COMMERCIAL

## 2025-07-01 VITALS
RESPIRATION RATE: 16 BRPM | SYSTOLIC BLOOD PRESSURE: 105 MMHG | HEART RATE: 72 BPM | OXYGEN SATURATION: 97 % | DIASTOLIC BLOOD PRESSURE: 68 MMHG | TEMPERATURE: 98 F

## 2025-07-01 VITALS
DIASTOLIC BLOOD PRESSURE: 76 MMHG | HEART RATE: 71 BPM | RESPIRATION RATE: 18 BRPM | HEIGHT: 63 IN | SYSTOLIC BLOOD PRESSURE: 119 MMHG | TEMPERATURE: 98 F | OXYGEN SATURATION: 97 % | WEIGHT: 212.97 LBS

## 2025-07-01 DIAGNOSIS — Z98.1 ARTHRODESIS STATUS: Chronic | ICD-10-CM

## 2025-07-01 PROCEDURE — L9991: CPT

## 2025-07-07 LAB — SURGICAL PATHOLOGY STUDY: SIGNIFICANT CHANGE UP

## 2025-07-08 ENCOUNTER — LABORATORY RESULT (OUTPATIENT)
Age: 35
End: 2025-07-08

## 2025-07-08 ENCOUNTER — APPOINTMENT (OUTPATIENT)
Dept: ANTEPARTUM | Facility: CLINIC | Age: 35
End: 2025-07-08
Payer: COMMERCIAL

## 2025-07-08 ENCOUNTER — APPOINTMENT (OUTPATIENT)
Dept: MATERNAL FETAL MEDICINE | Facility: CLINIC | Age: 35
End: 2025-07-08

## 2025-07-08 VITALS
WEIGHT: 212.5 LBS | SYSTOLIC BLOOD PRESSURE: 118 MMHG | OXYGEN SATURATION: 97 % | HEART RATE: 70 BPM | BODY MASS INDEX: 37.65 KG/M2 | HEIGHT: 63 IN | DIASTOLIC BLOOD PRESSURE: 73 MMHG

## 2025-07-08 PROBLEM — O00.90 CESAREAN SCAR ECTOPIC PREGNANCY: Status: ACTIVE | Noted: 2025-07-01

## 2025-07-08 PROBLEM — Z09 SURGICAL FOLLOWUP: Status: ACTIVE | Noted: 2025-07-08

## 2025-07-08 LAB
BILIRUB UR QL STRIP: NORMAL
CLARITY UR: CLEAR
COLLECTION METHOD: NORMAL
GLUCOSE UR-MCNC: NORMAL
HCG UR QL: 0.2 EU/DL
HGB UR QL STRIP.AUTO: NORMAL
KETONES UR-MCNC: NORMAL
LEUKOCYTE ESTERASE UR QL STRIP: NORMAL
NITRITE UR QL STRIP: NORMAL
PH UR STRIP: 6
PROT UR STRIP-MCNC: NORMAL
SP GR UR STRIP: 1.02

## 2025-07-08 PROCEDURE — 76817 TRANSVAGINAL US OBSTETRIC: CPT

## 2025-07-08 PROCEDURE — 99024 POSTOP FOLLOW-UP VISIT: CPT

## 2025-07-08 PROCEDURE — 99215 OFFICE O/P EST HI 40 MIN: CPT | Mod: 25

## 2025-07-08 PROCEDURE — 99205 OFFICE O/P NEW HI 60 MIN: CPT | Mod: 25

## 2025-07-08 PROCEDURE — 76801 OB US < 14 WKS SINGLE FETUS: CPT

## 2025-07-08 RX ORDER — IBUPROFEN 600 MG/1
600 TABLET, FILM COATED ORAL EVERY 6 HOURS
Qty: 30 | Refills: 0 | Status: ACTIVE | COMMUNITY
Start: 2025-07-08 | End: 1900-01-01

## 2025-07-09 ENCOUNTER — APPOINTMENT (OUTPATIENT)
Dept: ANTEPARTUM | Facility: CLINIC | Age: 35
End: 2025-07-09

## 2025-07-11 ENCOUNTER — NON-APPOINTMENT (OUTPATIENT)
Age: 35
End: 2025-07-11

## 2025-07-15 ENCOUNTER — NON-APPOINTMENT (OUTPATIENT)
Age: 35
End: 2025-07-15

## 2025-07-16 ENCOUNTER — NON-APPOINTMENT (OUTPATIENT)
Age: 35
End: 2025-07-16

## (undated) DEVICE — SPECIMEN CONTAINER 100ML

## (undated) DEVICE — VACUUM CURETTE MEDGYN CURVED 7MM

## (undated) DEVICE — DRAPE LIGHT HANDLE COVER (BLUE)

## (undated) DEVICE — GOWN LG

## (undated) DEVICE — GLV 7 PROTEXIS (WHITE)

## (undated) DEVICE — SOL IRR POUR H2O 250ML

## (undated) DEVICE — FOLEY TRAY 16FR 5CC LTX UMETER CLOSED

## (undated) DEVICE — DRSG TELFA 3 X 8

## (undated) DEVICE — POSITIONER FOAM EGG CRATE ULNAR 2PCS (PINK)

## (undated) DEVICE — MEDICATION LABELS W MARKER

## (undated) DEVICE — VENODYNE/SCD SLEEVE CALF LARGE

## (undated) DEVICE — GLV 7.5 PROTEXIS (WHITE)

## (undated) DEVICE — GLV 8.5 PROTEXIS (WHITE)

## (undated) DEVICE — ULTRASOUND COVER PEELSAFE 5 X 58"

## (undated) DEVICE — VISITEC 4X4

## (undated) DEVICE — Device

## (undated) DEVICE — DRAPE LITHOTOMY PERI-GYN

## (undated) DEVICE — WARMING BLANKET UPPER ADULT

## (undated) DEVICE — GOWN TRIMAX LG

## (undated) DEVICE — DRAPE TOWEL BLUE 17" X 24"

## (undated) DEVICE — GLV 6.5 PROTEXIS (WHITE)

## (undated) DEVICE — PACK BASIC GOWN

## (undated) DEVICE — SOL IRR POUR NS 0.9% 500ML

## (undated) DEVICE — GLV 8 PROTEXIS (WHITE)

## (undated) DEVICE — LAP PAD 18 X 18"

## (undated) DEVICE — FOLEY TRAY 16FR LF URINE METER SURESTEP

## (undated) DEVICE — TUBING SUCTION CONNECTOR UTERINE ASPIRATION UVAC 0.5" X 6FT

## (undated) DEVICE — FOLEY CATH 2-WAY 16FR 5CC LATEX COUNCIL RED

## (undated) DEVICE — PREP BETADINE KIT

## (undated) DEVICE — TUBING UTERINE ASPIRATION 3/8" X 6FT W/O ADAPTER